# Patient Record
Sex: FEMALE | Race: OTHER | HISPANIC OR LATINO | Employment: STUDENT | URBAN - METROPOLITAN AREA
[De-identification: names, ages, dates, MRNs, and addresses within clinical notes are randomized per-mention and may not be internally consistent; named-entity substitution may affect disease eponyms.]

---
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
## 2023-02-11 ENCOUNTER — OFFICE VISIT (OUTPATIENT)
Dept: URGENT CARE | Facility: CLINIC | Age: 18
End: 2023-02-11

## 2023-02-11 VITALS
OXYGEN SATURATION: 98 % | HEIGHT: 67 IN | SYSTOLIC BLOOD PRESSURE: 111 MMHG | DIASTOLIC BLOOD PRESSURE: 55 MMHG | BODY MASS INDEX: 34.06 KG/M2 | RESPIRATION RATE: 16 BRPM | HEART RATE: 77 BPM | WEIGHT: 217 LBS

## 2023-02-11 DIAGNOSIS — Z02.4 DRIVER'S PERMIT PE (PHYSICAL EXAMINATION): Primary | ICD-10-CM

## 2023-02-11 NOTE — PROGRESS NOTES
[address] Now        NAME: Marco Rosas is a 21 y o  female  : 2005    MRN: [id number]  DATE: 2023  TIME: 2:00 PM    Assessment and Plan   's permit PE (physical examination) [Z02 4]  1  's permit PE (physical examination)              Patient Instructions     --'s permit physical form completed, no restrictions or limitations    Chief Complaint     Chief Complaint   Patient presents with   • drivers permit         History of Present Illness       Here for 's physical    No complaints or issues  No recent illness  Not current under treatment for any medical conditions  Denies injuries, concussions, or physical limitations  Denies history of seizures or other neurological problems  Denies cardiac, pulmonary conditions including sleep apnea  Wears glasses  Rx UTD  No problems with colors or night vision  No hearing loss  Denies mental health issues  Denies frequent/heavy alcohol use  Denies marijuana or recreational drug use  Review of Systems   Review of Systems   Constitutional: Negative for fever  HENT: Negative for hearing loss and sore throat  Eyes: Negative for visual disturbance  Respiratory: Negative for cough, shortness of breath and wheezing  Cardiovascular: Negative for chest pain and palpitations  Gastrointestinal: Negative for abdominal pain, blood in stool, constipation, diarrhea, nausea and vomiting  Genitourinary: Negative for difficulty urinating  Musculoskeletal: Negative for arthralgias and myalgias  Skin: Negative  Neurological: Negative for dizziness and headaches  Psychiatric/Behavioral: Negative  Current Medications     No current outpatient medications on file      Current Allergies     Allergies as of 2023   • (No Known Allergies)            The following portions of the patient's history were reviewed and updated as appropriate: allergies, current medications, past family history, past medical history, past social history, past surgical history and problem list      No past medical history on file  History reviewed  No pertinent surgical history  History reviewed  No pertinent family history  Medications have been verified  Objective   /55   Pulse 77   Resp 16   Ht 5' 7" (1 702 m)   Wt 98 4 kg (217 lb)   SpO2 98%   BMI 33 99 kg/m²   No LMP recorded  Physical Exam     Physical Exam  Constitutional:       General: She is not in acute distress  Appearance: Normal appearance  She is well-developed  She is not ill-appearing, toxic-appearing or diaphoretic  HENT:      Head: Normocephalic  Right Ear: Tympanic membrane, ear canal and external ear normal       Left Ear: Tympanic membrane, ear canal and external ear normal       Nose: No congestion or rhinorrhea  Mouth/Throat:      Pharynx: No oropharyngeal exudate or posterior oropharyngeal erythema  Eyes:      General:         Right eye: No discharge  Left eye: No discharge  Cardiovascular:      Rate and Rhythm: Normal rate and regular rhythm  Heart sounds: Normal heart sounds  No murmur heard  Pulmonary:      Effort: Pulmonary effort is normal  No respiratory distress  Breath sounds: Normal breath sounds  No stridor  No wheezing, rhonchi or rales  Chest:      Chest wall: No tenderness  Abdominal:      Tenderness: There is no abdominal tenderness  Musculoskeletal:         General: No swelling, deformity or signs of injury  Normal range of motion  Cervical back: Neck supple  Lymphadenopathy:      Cervical: No cervical adenopathy  Skin:     General: Skin is warm and dry  Neurological:      Mental Status: She is alert and oriented to person, place, and time  Deep Tendon Reflexes: Reflexes are normal and symmetric  Reflexes normal    Psychiatric:         Mood and Affect: Mood normal          Thought Content:  Thought content normal          Judgment: Judgment normal

## 2023-02-23 ENCOUNTER — OFFICE VISIT (OUTPATIENT)
Dept: OBGYN CLINIC | Facility: CLINIC | Age: 18
End: 2023-02-23

## 2023-02-23 VITALS
BODY MASS INDEX: 33.43 KG/M2 | HEIGHT: 67 IN | WEIGHT: 213 LBS | SYSTOLIC BLOOD PRESSURE: 114 MMHG | RESPIRATION RATE: 18 BRPM | HEART RATE: 88 BPM | DIASTOLIC BLOOD PRESSURE: 73 MMHG

## 2023-02-23 DIAGNOSIS — E28.2 PCOS (POLYCYSTIC OVARIAN SYNDROME): ICD-10-CM

## 2023-02-23 DIAGNOSIS — Z30.09 ENCOUNTER FOR COUNSELING REGARDING CONTRACEPTION: Primary | ICD-10-CM

## 2023-02-23 DIAGNOSIS — Z11.3 SCREENING FOR STD (SEXUALLY TRANSMITTED DISEASE): ICD-10-CM

## 2023-02-23 RX ORDER — NORGESTIMATE AND ETHINYL ESTRADIOL 0.25-0.035
1 KIT ORAL DAILY
Qty: 28 TABLET | Refills: 3 | Status: SHIPPED | OUTPATIENT
Start: 2023-02-23 | End: 2023-05-23

## 2023-02-24 ENCOUNTER — TELEPHONE (OUTPATIENT)
Dept: OBGYN CLINIC | Facility: CLINIC | Age: 18
End: 2023-02-24

## 2023-02-24 DIAGNOSIS — A74.9 CHLAMYDIA: Primary | ICD-10-CM

## 2023-02-24 LAB
C TRACH DNA SPEC QL NAA+PROBE: POSITIVE
N GONORRHOEA DNA SPEC QL NAA+PROBE: NEGATIVE

## 2023-02-24 RX ORDER — DOXYCYCLINE 100 MG/1
100 TABLET ORAL 2 TIMES DAILY
Qty: 14 TABLET | Refills: 0 | Status: SHIPPED | OUTPATIENT
Start: 2023-02-24 | End: 2023-03-03

## 2023-02-24 NOTE — TELEPHONE ENCOUNTER
----- Message from [address] sent at 2/24/2023  8:20 AM EST -----  Please call the patient regarding her abnormal result  Please call pt to inform that she has Chlamydia  Doxycycline 100 mgs BID  x7 days has been ordered  Her partner is to be treated  Recommend to abstain x 7 days post treatment  Recommed condom use whenever she is sexually active  Retest in 3 months     Thanks

## 2023-02-24 NOTE — TELEPHONE ENCOUNTER
Pt informed of positive chlamydia results  Special instructions explained to pt  · Antibiotics  Were ordered to your pharmacy on file  The antibiotic is used to kill the bacteria that causes chlamydia  Take them as directed  · Wash your hands often  Use soap and water  Wash your hands after you use the bathroom  This helps prevent the infection from spreading to other parts of your body, such as your eyes  · Use a latex condom during sex to prevent chlamydia and other STIs  Use a new condom each time you have sex  · Talk to your sex partners  Tell anyone you have had sex with in the last 3 months that you have chlamydia  They may also be infected and need treatment  Ask your sex partners to get tested before you have sex  · Do not have sex until you and your partner have taken all of your antibiotics  · Get regular screenings for STIs  If you are pregnant:  You can spread chlamydia to your baby while you are pregnant  Your baby could get an eye infection or pneumonia  Chlamydia may also cause your baby to be born too early  Early treatment may prevent your baby from getting chlamydia

## 2023-02-24 NOTE — TELEPHONE ENCOUNTER
ID [id number]    Attempted to call, elft a message asking for patient to call the office  Office number provided in message

## 2023-04-24 ENCOUNTER — APPOINTMENT (EMERGENCY)
Dept: RADIOLOGY | Facility: HOSPITAL | Age: 18
End: 2023-04-24

## 2023-04-24 ENCOUNTER — HOSPITAL ENCOUNTER (EMERGENCY)
Facility: HOSPITAL | Age: 18
Discharge: HOME | End: 2023-04-24
Attending: EMERGENCY MEDICINE

## 2023-04-24 VITALS
HEIGHT: 67 IN | DIASTOLIC BLOOD PRESSURE: 73 MMHG | WEIGHT: 210 LBS | TEMPERATURE: 97.8 F | OXYGEN SATURATION: 98 % | HEART RATE: 77 BPM | SYSTOLIC BLOOD PRESSURE: 128 MMHG | BODY MASS INDEX: 32.96 KG/M2 | RESPIRATION RATE: 20 BRPM

## 2023-04-24 DIAGNOSIS — R07.9 CHEST PAIN, UNSPECIFIED TYPE: Primary | ICD-10-CM

## 2023-04-24 PROBLEM — Z11.3 SCREENING FOR STD (SEXUALLY TRANSMITTED DISEASE): Status: RESOLVED | Noted: 2023-02-23 | Resolved: 2023-04-24

## 2023-04-24 LAB
EXT PREGNANCY TEST URINE: NEGATIVE
EXT. CONTROL: NORMAL

## 2023-04-24 NOTE — Clinical Note
Layla Petersen was seen and treated in our emergency department on 4/24/2023  No restrictions            Diagnosis:     Esther Humphreys  may return to work on return date  She may return on this date: 04/25/2023         If you have any questions or concerns, please don't hesitate to call        Sid Tellez MD    ______________________________           _______________          _______________  Hospital Representative                              Date                                Time

## 2023-04-24 NOTE — ED PROVIDER NOTES
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 she developed sudden onset sharp substernal chest pain  No radiation  Not worse with exertion  Has been on and off since then, from as low as 3/10 to as high as 9/10  Maybe some shortness of breath  No other associated symptoms, such as nausea, vomiting, diaphoresis, palpitations, hemoptysis  Has never experienced anything like this before  Has experienced reflux in the past and states this is does not feel reflux  Denies family history of sudden cardiac death or cardiac issues, other than hypertension  Patient does state that she had an upper respiratory illness last week, likely viral, with symptoms of cough, congestion, sore throat, resolved without treatment  Prior to Admission Medications   Prescriptions Last Dose Informant Patient Reported? Taking?   norgestimate-ethinyl estradiol (Sprintec 28) 0 25-35 MG-MCG per tablet   No No   Sig: Take 1 tablet by mouth daily      Facility-Administered Medications: None       History reviewed  No pertinent past medical history  History reviewed  No pertinent surgical history  Family History   Problem Relation Age of Onset   • No Known Problems Mother    • Prostate cancer Father    • No Known Problems Brother    • No Known Problems Brother    • Breast cancer Maternal Aunt    • Colon cancer Neg Hx    • Ovarian cancer Neg Hx    • Deep vein thrombosis Neg Hx    • Pulmonary embolism Neg Hx    • Heart attack Neg Hx    • Stroke Neg Hx      I have reviewed and agree with the history as documented      E-Cigarette/Vaping   • E-Cigarette Use Never User      E-Cigarette/Vaping Substances     Social History     Tobacco Use   • Smoking status: Never • Smokeless tobacco: Never   Vaping Use   • Vaping Use: Never used   Substance Use Topics   • Alcohol use: Never   • Drug use: Never        Review of Systems   Constitutional: Negative  HENT: Negative  Respiratory: Positive for shortness of breath  Negative for cough  Cardiovascular: Positive for chest pain  Negative for palpitations and leg swelling  Gastrointestinal: Negative  Genitourinary: Negative  Musculoskeletal: Negative  Skin: Negative  Neurological: Negative  Psychiatric/Behavioral: Negative  Physical Exam  ED Triage Vitals [04/24/23 1855]   Temperature Pulse Respirations Blood Pressure SpO2   97 8 °F (36 6 °C) 77 20 128/73 98 %      Temp Source Heart Rate Source Patient Position - Orthostatic VS BP Location FiO2 (%)   Oral Monitor Lying Right arm --      Pain Score       --             Orthostatic Vital Signs  Vitals:    04/24/23 1855   BP: 128/73   Pulse: 77   Patient Position - Orthostatic VS: Lying       Physical Exam  Vitals reviewed  Constitutional:       General: She is not in acute distress  Appearance: She is well-developed  She is obese  She is not ill-appearing or diaphoretic  Cardiovascular:      Rate and Rhythm: Normal rate and regular rhythm  Heart sounds: Normal heart sounds  Comments:   -Chest pain reproducible with palpation  Pulmonary:      Effort: Pulmonary effort is normal  No tachypnea  Breath sounds: Normal breath sounds  Abdominal:      General: Bowel sounds are normal       Palpations: Abdomen is soft  Musculoskeletal:         General: Normal range of motion  Right lower leg: No edema  Left lower leg: No edema  Skin:     General: Skin is warm and dry  Neurological:      General: No focal deficit present  Mental Status: She is alert and oriented to person, place, and time     Psychiatric:         Mood and Affect: Mood normal          Behavior: Behavior normal          ED Medications  Medications - No data to display    Diagnostic Studies  Results Reviewed     None                 No orders to display         Procedures  ECG 12 Lead Documentation Only    Date/Time: 4/24/2023 7:53 PM  Performed by: Maxi Menjivar MD  Authorized by: Maxi Menjivar MD     Indications / Diagnosis:  Chest pain  ECG reviewed by me, the ED Provider: yes    Patient location:  ED  Previous ECG:     Previous ECG:  Unavailable  Interpretation:     Interpretation: normal    Rate:     ECG rate assessment: normal    Rhythm:     Rhythm: sinus rhythm    Ectopy:     Ectopy: none    QRS:     QRS axis:  Normal  Conduction:     Conduction: normal    ST segments:     ST segments:  Normal  T waves:     T waves: normal            ED Course                                       Medical Decision Making  70-year-old female with no significant past medical history presenting today for chest pain  Sharp, substernal pain onset at rest, not worse with exertion  No other associated symptoms  Reproducible with palpation  EKG normal sinus rhythm, as independently interpreted by myself  Chest pain with no acute cardiopulmonary abnormalities, as independently interpreted by myself  Doubtful ACS  Consider pericarditis given recent viral illness  However, no evidence on EKG  Consider PE, given PERC positive (hormone use), but Wells score 0  Differential includes GERD, idiopathic pleurisy, costochondritis  However, etiology still undetermined  Pain improving without treatment at time of discharge  Patient discharged home with return precautions and instructed to follow-up with her PCP  Chest pain, unspecified type: acute illness or injury  Amount and/or Complexity of Data Reviewed  Labs: ordered  Radiology: ordered and independent interpretation performed  Decision-making details documented in ED Course  ECG/medicine tests: ordered and independent interpretation performed   Decision-making details documented in ED Course  Disposition  Final diagnoses:   None     ED Disposition     None      Follow-up Information    None         Patient's Medications   Discharge Prescriptions    No medications on file     No discharge procedures on file  PDMP Review     None           ED Provider  Attending physically available and evaluated Shonda Rich I managed the patient along with the ED Attending      Electronically Signed by         Zenaida Flores MD  04/24/23 2026

## 2023-04-25 LAB
ATRIAL RATE: 72 BPM
P AXIS: 50 DEGREES
PR INTERVAL: 132 MS
QRS AXIS: 17 DEGREES
QRSD INTERVAL: 78 MS
QT INTERVAL: 382 MS
QTC INTERVAL: 418 MS
T WAVE AXIS: 24 DEGREES
VENTRICULAR RATE: 72 BPM

## 2023-06-06 PROBLEM — A74.9 CHLAMYDIA: Status: ACTIVE | Noted: 2023-06-06

## 2023-06-06 PROBLEM — Z20.2 POSSIBLE EXPOSURE TO STD: Status: ACTIVE | Noted: 2023-06-06

## 2023-06-24 ENCOUNTER — HOSPITAL ENCOUNTER (EMERGENCY)
Facility: HOSPITAL | Age: 18
Discharge: HOME | End: 2023-06-24
Attending: EMERGENCY MEDICINE
Payer: COMMERCIAL

## 2023-06-24 ENCOUNTER — APPOINTMENT (EMERGENCY)
Dept: RADIOLOGY | Facility: HOSPITAL | Age: 18
End: 2023-06-24
Payer: COMMERCIAL

## 2023-06-24 VITALS
HEART RATE: 86 BPM | BODY MASS INDEX: 31.83 KG/M2 | HEIGHT: 68 IN | SYSTOLIC BLOOD PRESSURE: 128 MMHG | TEMPERATURE: 99 F | DIASTOLIC BLOOD PRESSURE: 62 MMHG | WEIGHT: 210 LBS | RESPIRATION RATE: 18 BRPM | OXYGEN SATURATION: 98 %

## 2023-06-24 DIAGNOSIS — M25.539 WRIST PAIN: ICD-10-CM
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
## 2023-06-24 PROCEDURE — 29130 APPL FINGER SPLINT STATIC: CPT | Performed by: EMERGENCY MEDICINE

## 2023-06-24 PROCEDURE — 73110 X-RAY EXAM OF WRIST: CPT

## 2023-06-24 PROCEDURE — 99284 EMERGENCY DEPT VISIT MOD MDM: CPT | Performed by: EMERGENCY MEDICINE

## 2023-06-24 PROCEDURE — 99284 EMERGENCY DEPT VISIT MOD MDM: CPT

## 2023-06-24 RX ORDER — IBUPROFEN 600 MG/1
600 TABLET ORAL ONCE
Status: COMPLETED | OUTPATIENT
Start: 2023-06-24 | End: 2023-06-24

## 2023-06-24 RX ORDER — ACETAMINOPHEN 325 MG/1
650 TABLET ORAL ONCE
Status: COMPLETED | OUTPATIENT
Start: 2023-06-24 | End: 2023-06-24

## 2023-06-24 RX ADMIN — ACETAMINOPHEN 650 MG: 325 TABLET ORAL at 16:36

## 2023-06-24 RX ADMIN — IBUPROFEN 600 MG: 600 TABLET, FILM COATED ORAL at 18:23
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
## 2023-06-24 NOTE — Clinical Note
Bartolo Lira was seen and treated in our emergency department on 6/24/2023  No work until cleared by Family Doctor/Orthopedics        Diagnosis:     Elder    She may return on this date: If you have any questions or concerns, please don't hesitate to call        Ryan Stern, DO    ______________________________           _______________          _______________  Hospital Representative                              Date                                Time
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
## 2023-06-25 ENCOUNTER — HOSPITAL ENCOUNTER (EMERGENCY)
Facility: HOSPITAL | Age: 18
Discharge: HOME | End: 2023-06-25
Attending: EMERGENCY MEDICINE
Payer: COMMERCIAL

## 2023-06-25 VITALS
RESPIRATION RATE: 16 BRPM | DIASTOLIC BLOOD PRESSURE: 72 MMHG | TEMPERATURE: 98.1 F | WEIGHT: 210 LBS | OXYGEN SATURATION: 100 % | HEIGHT: 68 IN | BODY MASS INDEX: 31.83 KG/M2 | HEART RATE: 70 BPM | SYSTOLIC BLOOD PRESSURE: 119 MMHG

## 2023-06-25 DIAGNOSIS — M79.609 PAIN IN LIMB: Primary | ICD-10-CM

## 2023-06-25 PROCEDURE — 96372 THER/PROPH/DIAG INJ SC/IM: CPT

## 2023-06-25 PROCEDURE — 99283 EMERGENCY DEPT VISIT LOW MDM: CPT

## 2023-06-25 RX ORDER — KETOROLAC TROMETHAMINE 30 MG/ML
30 INJECTION, SOLUTION INTRAMUSCULAR; INTRAVENOUS ONCE
Status: COMPLETED | OUTPATIENT
Start: 2023-06-25 | End: 2023-06-25

## 2023-06-25 RX ADMIN — KETOROLAC TROMETHAMINE 30 MG: 30 INJECTION, SOLUTION INTRAMUSCULAR at 17:10
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
## 2023-06-25 NOTE — DISCHARGE INSTRUCTIONS
Return to er with new sx, worsening sx or with any other concerns  Follow up with ortho as instructed yesterday

## 2023-06-26 ENCOUNTER — TELEPHONE (OUTPATIENT)
Dept: OBGYN CLINIC | Facility: OTHER | Age: 18
End: 2023-06-26

## 2023-06-26 NOTE — TELEPHONE ENCOUNTER
Patient is being referred to a orthopedics  Please schedule accordingly      [address]   [phone]

## 2023-07-08 ENCOUNTER — OFFICE VISIT (OUTPATIENT)
Dept: OBGYN CLINIC | Facility: CLINIC | Age: 18
End: 2023-07-08

## 2023-07-08 VITALS
WEIGHT: 225 LBS | BODY MASS INDEX: 34.1 KG/M2 | SYSTOLIC BLOOD PRESSURE: 117 MMHG | HEIGHT: 68 IN | OXYGEN SATURATION: 98 % | HEART RATE: 83 BPM | DIASTOLIC BLOOD PRESSURE: 85 MMHG

## 2023-07-08 DIAGNOSIS — S60.211A CONTUSION OF RIGHT WRIST, INITIAL ENCOUNTER: Primary | ICD-10-CM
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
## 2023-07-08 DIAGNOSIS — M25.539 WRIST PAIN: ICD-10-CM

## 2023-07-08 PROCEDURE — 99203 OFFICE O/P NEW LOW 30 MIN: CPT | Performed by: PHYSICIAN ASSISTANT
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
## 2023-07-14 ENCOUNTER — HOSPITAL ENCOUNTER (EMERGENCY)
Facility: HOSPITAL | Age: 18
Discharge: HOME | End: 2023-07-14
Attending: EMERGENCY MEDICINE
Payer: COMMERCIAL

## 2023-07-14 VITALS
SYSTOLIC BLOOD PRESSURE: 103 MMHG | OXYGEN SATURATION: 100 % | HEART RATE: 85 BPM | RESPIRATION RATE: 17 BRPM | TEMPERATURE: 98.4 F | DIASTOLIC BLOOD PRESSURE: 60 MMHG

## 2023-07-14 DIAGNOSIS — R21 RASH: Primary | ICD-10-CM

## 2023-07-14 PROCEDURE — 99283 EMERGENCY DEPT VISIT LOW MDM: CPT

## 2023-07-14 PROCEDURE — 99283 EMERGENCY DEPT VISIT LOW MDM: CPT | Performed by: PHYSICIAN ASSISTANT

## 2023-07-14 RX ORDER — DIAPER,BRIEF,INFANT-TODD,DISP
EACH MISCELLANEOUS
Qty: 45 G | Refills: 0 | Status: SHIPPED | OUTPATIENT
Start: 2023-07-14

## 2023-07-14 NOTE — ED PROVIDER NOTES
History  Chief Complaint   Patient presents with   • Breast Problem     Pt presents with a two day hx of itchy painful rash to right breast, denies any new products, no breast feeding     Past Medical History: Back pain, PCOS   No PSH  Pt presents to ED c/o 5 days of symptoms that began with pain to lower/outer right breast then the next day noticed a red, pruritic rash to medial, upper aspect of right breast while in the shower, that has persisted and gotten worse over the past few days. NO fever, no cp, sob, no dc, no breast mass/lumps felt. PT denies known allergens, new contacts, sprays, lotions, detergents, products. Pt states she was concerned, " bc her father has cancer and she wanted to be checked out"            Prior to Admission Medications   Prescriptions Last Dose Informant Patient Reported? Taking?   norgestimate-ethinyl estradiol (Sprintec 28) 0.25-35 MG-MCG per tablet   No No   Sig: Take 1 tablet by mouth daily      Facility-Administered Medications: None       Past Medical History:   Diagnosis Date   • Back pain    • PCOS (polycystic ovarian syndrome)        History reviewed. No pertinent surgical history. Family History   Problem Relation Age of Onset   • No Known Problems Mother    • Prostate cancer Father    • No Known Problems Brother    • No Known Problems Brother    • Breast cancer Maternal Aunt    • Colon cancer Neg Hx    • Ovarian cancer Neg Hx    • Deep vein thrombosis Neg Hx    • Pulmonary embolism Neg Hx    • Heart attack Neg Hx    • Stroke Neg Hx      I have reviewed and agree with the history as documented. E-Cigarette/Vaping   • E-Cigarette Use Never User      E-Cigarette/Vaping Substances     Social History     Tobacco Use   • Smoking status: Never   • Smokeless tobacco: Never   Vaping Use   • Vaping Use: Never used   Substance Use Topics   • Alcohol use: Never   • Drug use: Never       Review of Systems   Constitutional: Negative for fever.    HENT: Negative for rhinorrhea and trouble swallowing. Respiratory: Negative for shortness of breath. Cardiovascular: Negative for chest pain. Gastrointestinal: Negative for diarrhea and vomiting. Genitourinary: Negative for vaginal bleeding and vaginal discharge. Musculoskeletal: Negative for myalgias. Skin: Positive for rash. Neurological: Negative for headaches. All other systems reviewed and are negative. Physical Exam  Physical Exam  Vitals and nursing note reviewed. Constitutional:       General: She is not in acute distress. Appearance: She is well-developed. She is obese. HENT:      Head: Normocephalic and atraumatic. Right Ear: External ear normal.      Left Ear: External ear normal.      Nose: Nose normal.      Mouth/Throat:      Mouth: Mucous membranes are moist.      Pharynx: Oropharynx is clear. Eyes:      Conjunctiva/sclera: Conjunctivae normal.   Cardiovascular:      Rate and Rhythm: Tachycardia present. Pulmonary:      Effort: Pulmonary effort is normal. No respiratory distress. Chest:   Breasts:     Right: Skin change and tenderness present. No swelling, bleeding, inverted nipple or nipple discharge. Comments: Area of rash (circled in red) appears as slightly raised, red rash with linear excoriation, no erythema, no warmth, no dc, no palpable mass, no dimpling, no nipple dc, no abscess  Musculoskeletal:         General: Normal range of motion. Cervical back: Normal range of motion. Skin:     General: Skin is warm and dry. Findings: Rash present. Neurological:      Mental Status: She is alert and oriented to person, place, and time.    Psychiatric:         Behavior: Behavior normal.         Vital Signs  ED Triage Vitals   Temperature Pulse Respirations Blood Pressure SpO2   07/14/23 1258 07/14/23 1256 07/14/23 1256 07/14/23 1256 07/14/23 1256   98.4 °F (36.9 °C) 85 17 103/60 100 %      Temp Source Heart Rate Source Patient Position - Orthostatic VS BP Location FiO2 (%)   07/14/23 1258 07/14/23 1256 07/14/23 1256 07/14/23 1256 --   Oral Monitor Sitting Right arm       Pain Score       --                  Vitals:    07/14/23 1256   BP: 103/60   Pulse: 85   Patient Position - Orthostatic VS: Sitting         Visual Acuity      ED Medications  Medications - No data to display    Diagnostic Studies  Results Reviewed     None                 No orders to display              Procedures  Procedures         ED Course         CRAFFT    Flowsheet Row Most Recent Value   CRAFFT Initial Screen: During the past 12 months, did you:    1. Drink any alcohol (more than a few sips)? No Filed at: 07/14/2023 1258   2. Smoke any marijuana or hashish No Filed at: 07/14/2023 1258   3. Use anything else to get high? ("anything else" includes illegal drugs, over the counter and prescription drugs, and things that you sniff or 'ramos')? No Filed at: 07/14/2023 1258                                          Medical Decision Making  Patient here with pain and pruritic rash to right breast  DDx Contact dermatitis, insect bite/sting, local allergy, cellulitis, among others  Rash appears more like local allergic reaction we will treat with topical low-dose steroid and p.o. Benadryl. Risk  OTC drugs. Prescription drug management. Disposition  Final diagnoses:   Rash - right breast     Time reflects when diagnosis was documented in both MDM as applicable and the Disposition within this note     Time User Action Codes Description Comment    7/14/2023  1:14 PM Davidalton aFy Add [R21] Rash     7/14/2023  1:14 PM David Laundry Modify [R21] Rash right breast      ED Disposition     ED Disposition   Discharge    Condition   Stable    Date/Time   Fri Jul 14, 2023  1:13 PM    Comment   Jonathan Jeong discharge to home/self care.                Follow-up Information     Follow up With Specialties Details Why Contact [facility]   [address] Patient's Medications   Discharge Prescriptions    HYDROCORTISONE 1 % CREAM    Apply to affected area 2 times daily       Start Date: 7/14/2023 End Date: --       Order Dose: --       Quantity: 45 g    Refills: 0       No discharge procedures on file.     PDMP Review     None          ED Provider  Electronically Signed by           Dodie Musa PA-C  07/14/23 3693

## 2023-07-14 NOTE — DISCHARGE INSTRUCTIONS
Use Tylenol every 4 hours or Ibuprofen every 6 hours; you can alternate the 2 medications taking something every 3 hours for pain. Use Steroid cream as directed. You can use over-the-counter antihistamines, Benadryl 25mg every 6 hours for itching and 50mg at night. Ice to area will also help with pain/sweling/itching/rash. Try to avoid anything getting contact with skin    Follow-up with your doctor in next few days.

## 2023-07-18 ENCOUNTER — APPOINTMENT (OUTPATIENT)
Dept: RADIOLOGY | Facility: AMBULARY SURGERY CENTER | Age: 18
End: 2023-07-18
Attending: STUDENT IN AN ORGANIZED HEALTH CARE EDUCATION/TRAINING PROGRAM

## 2023-07-18 ENCOUNTER — OFFICE VISIT (OUTPATIENT)
Dept: OBGYN CLINIC | Facility: CLINIC | Age: 18
End: 2023-07-18
Payer: COMMERCIAL

## 2023-07-18 VITALS
DIASTOLIC BLOOD PRESSURE: 72 MMHG | HEART RATE: 75 BPM | SYSTOLIC BLOOD PRESSURE: 114 MMHG | HEIGHT: 68 IN | WEIGHT: 225 LBS | BODY MASS INDEX: 34.1 KG/M2

## 2023-07-18 DIAGNOSIS — M25.531 PAIN IN RIGHT WRIST: ICD-10-CM

## 2023-07-18 DIAGNOSIS — S60.211A CONTUSION OF RIGHT WRIST, INITIAL ENCOUNTER: Primary | ICD-10-CM

## 2023-07-18 PROCEDURE — 99213 OFFICE O/P EST LOW 20 MIN: CPT | Performed by: STUDENT IN AN ORGANIZED HEALTH CARE EDUCATION/TRAINING PROGRAM

## 2023-07-18 PROCEDURE — 73110 X-RAY EXAM OF WRIST: CPT
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
## 2023-08-08 ENCOUNTER — EVALUATION (OUTPATIENT)
Dept: OCCUPATIONAL THERAPY | Facility: CLINIC | Age: 18
End: 2023-08-08
Payer: COMMERCIAL

## 2023-08-08 DIAGNOSIS — S60.211A CONTUSION OF RIGHT WRIST, INITIAL ENCOUNTER: ICD-10-CM

## 2023-08-08 DIAGNOSIS — M25.531 PAIN IN RIGHT WRIST: Primary | ICD-10-CM

## 2023-08-08 PROCEDURE — 97165 OT EVAL LOW COMPLEX 30 MIN: CPT
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
## 2023-08-11 ENCOUNTER — OFFICE VISIT (OUTPATIENT)
Dept: OCCUPATIONAL THERAPY | Facility: CLINIC | Age: 18
End: 2023-08-11
Payer: COMMERCIAL

## 2023-08-11 DIAGNOSIS — M25.531 PAIN IN RIGHT WRIST: Primary | ICD-10-CM

## 2023-08-11 PROCEDURE — 97140 MANUAL THERAPY 1/> REGIONS: CPT

## 2023-08-11 PROCEDURE — 97110 THERAPEUTIC EXERCISES: CPT

## 2023-08-11 NOTE — PROGRESS NOTES
Daily Note     Today's date: 2023  Patient name: Irma Eaton  : 2005  MRN: [id number]  Referring provider: Chhaya Bran MD  Dx:   Encounter Diagnosis     ICD-10-CM    1. Pain in right wrist  M25.531                      Subjective: It was a little sore yesterday but it is definitely getting better      Objective: See treatment diary below  Wrist Flexion: 50  Extension: 70    Assessment: Tolerated treatment well. Patient displayed significantly increased wrist and digit ROM on this date after AROM and therapeutic activities. Patient is progressing very well. Fatigue noted during sustained pencil grasps    Plan: Continue per plan of care. Progress treatment as tolerated.        Precautions: Universal      Manuals            STM  4'           MEM  4'                                     Neuro Re-Ed                                                                                                        Ther Ex             HEP Tendon glides    Opposition    Wrist F/E            Digit AROM             Wrist AROM  x20 F/E and R/U deviation w/ dowel           Hook fist  x5 down and back           Metal Sphere  2'           TB rotations             Wrist Maze  x5           Pencil Grasps  x1 (Down to 6)           Ther Activity             Keypegs  x1                                                               Modalities             MHP  5'

## 2023-08-16 ENCOUNTER — OFFICE VISIT (OUTPATIENT)
Dept: OCCUPATIONAL THERAPY | Facility: CLINIC | Age: 18
End: 2023-08-16
Payer: COMMERCIAL

## 2023-08-16 DIAGNOSIS — M25.531 PAIN IN RIGHT WRIST: Primary | ICD-10-CM

## 2023-08-16 PROCEDURE — 97110 THERAPEUTIC EXERCISES: CPT

## 2023-08-16 NOTE — PROGRESS NOTES
Daily Note     Today's date: 2023  Patient name: Ema Jaramillo  : 2005  MRN: [id number]  Referring provider: Nan Crenshaw MD  Dx:   Encounter Diagnosis     ICD-10-CM    1. Pain in right wrist  M25.531                      Subjective: It was sore when I woke up but it's feeling better now. I'm very happy with how therapy is going      Objective: See treatment diary below  Wrist Flexion: 55  Extension: 70    Assessment: Tolerated treatment well. Patient displayed ability to touch the pads of their digits to their palm today but continues to be unable to form a full composite fist. Wrist ROM is also improving but still limited compared to the contralateral side. Patient continues to have pain and discomfort on the ulnar wrist, especially in the morning. Patient is improving well and would benefit from continued OP OT services to decrease pain and increase functional use of the affected UE. Plan: Continue per plan of care. Progress treatment as tolerated.        Precautions: Universal      Manuals           STM  4' 4'          MEM  4' 4'                                    Neuro Re-Ed                                                                                                        Ther Ex             HEP Tendon glides    Opposition    Wrist F/E            Digit AROM             Wrist AROM  x20 F/E and R/U deviation w/ dowel x20 F/E and R/U deviation w/ dowel          Hook fist  x5 down and back x5 down and back          Metal Sphere  2' 2'          TB rotations             Wrist Maze  x5 x5          Pencil Grasps  x1 (Down to 6)           Ther Activity             Keypegs  x1 x1          Colored Pegs   x3 rows                                                 Modalities             MHP  5' 5'

## 2023-08-18 ENCOUNTER — OFFICE VISIT (OUTPATIENT)
Dept: OCCUPATIONAL THERAPY | Facility: CLINIC | Age: 18
End: 2023-08-18
Payer: COMMERCIAL

## 2023-08-18 DIAGNOSIS — S60.211A CONTUSION OF RIGHT WRIST, INITIAL ENCOUNTER: ICD-10-CM

## 2023-08-18 DIAGNOSIS — M25.531 PAIN IN RIGHT WRIST: Primary | ICD-10-CM

## 2023-08-18 PROCEDURE — 97110 THERAPEUTIC EXERCISES: CPT

## 2023-08-18 PROCEDURE — 97140 MANUAL THERAPY 1/> REGIONS: CPT

## 2023-08-18 PROCEDURE — 97530 THERAPEUTIC ACTIVITIES: CPT

## 2023-08-18 NOTE — PROGRESS NOTES
Daily Note     Today's date: 2023  Patient name: Luana Chino  : 2005  MRN: [id number]  Referring provider: Nelly Ignacio MD  Dx:   Encounter Diagnosis     ICD-10-CM    1. Pain in right wrist  M25.531       2. Contusion of right wrist, initial encounter  J33.893R                      Subjective: It hurts mostly here (referring to ulnar wrist). Objective: See treatment diary below      Assessment: Tolerated treatment well. Tenderness and tightness with IASTM and stretching, but was lessened post-tx. Applied KT to ular f/a, and wrist for support. Plan: Continue per plan of care. Progress treatment as tolerated.        Precautions: Universal      Manuals          STM  4' 4'          MEM  4' 4'          IASTM uln wrist    8'                      Neuro Re-Ed                                                                                                        Ther Ex             HEP Tendon glides    Opposition    Wrist F/E            Wrist stretching    5'         Wrist AROM  x20 F/E and R/U deviation w/ dowel x20 F/E and R/U deviation w/ dowel          Hook fist  x5 down and back x5 down and back          Metal Sphere  2' 2' 2'         Wall walking    TB 5x each         Wrist Maze  x5 x5 5x         Pencil Grasps  x1 (Down to 6)           Ther Activity             Keypegs  x1 x1 1x         Colored Pegs   x3 rows                                                 Modalities             MHP  5' 5' 5'

## 2023-08-24 ENCOUNTER — OFFICE VISIT (OUTPATIENT)
Dept: OCCUPATIONAL THERAPY | Facility: CLINIC | Age: 18
End: 2023-08-24
Payer: COMMERCIAL

## 2023-08-24 DIAGNOSIS — S60.211A CONTUSION OF RIGHT WRIST, INITIAL ENCOUNTER: ICD-10-CM

## 2023-08-24 DIAGNOSIS — M25.531 PAIN IN RIGHT WRIST: Primary | ICD-10-CM

## 2023-08-24 PROCEDURE — 97140 MANUAL THERAPY 1/> REGIONS: CPT

## 2023-08-24 PROCEDURE — 97110 THERAPEUTIC EXERCISES: CPT

## 2023-08-24 PROCEDURE — 97530 THERAPEUTIC ACTIVITIES: CPT

## 2023-08-24 NOTE — PROGRESS NOTES
Psychosis  Psychosis is a symptom of certain mental health problems. It involves perceiving reality differently from those around you. The difference between reality and what you think become blurred in your mind. Other mental health conditions, physical diseases, traumatic experiences, or drugs and toxins may bring on psychotic symptoms or behavior.  There are different kinds of psychosis:  · Drug-induced (due to alcohol, methamphetamine, cocaine, LSD, PCP and others)  · Bipolar disorder  · Depression  · Schizophrenia  · Dementia  Symptoms  The symptoms of psychosis may not all be the same for each person. However, they usually involve:  · Hallucinations. Seeing, hearing, feeling, or even tasting or smelling things that are not there  · Delusions. Believing something that is not true, or false beliefs that are not part of a person's Methodist or cultural background.  There may also be disturbances in thinking, speech and behavior, which can include:  · Hearing voices that others do not hear  · Seeing things that others do not see  · Racing thoughts  · Lack of energy  · Feeling very fearful  · Disorganized speech  · Intentional or unintentional bodily harm to others  · Paranoia  · Trouble thinking or concentrating clearly  · Depression, feeling suicidal  · Insomnia  · Withdrawal from those around you  Treatment for psychosis depends on the cause. Medicine, with or without psychotherapy, is often used.  Home care  · Find a healthcare provider and therapist who meet your needs.  Seek help when you feel like your symptoms are returning  · Be certain to tell each of your healthcare providers about all of the prescription drugs, over-the-counter medicines, and supplements you take. Certain supplements interact with medicines and can result in dangerous side effects. Ask your pharmacist when you have questions about drug interactions.  · Be sure to take your medicine as directed even if you think you don't need  Daily Note     Today's date: 2023  Patient name: Veronica Sanz  : 2005  MRN: [id number]  Referring provider: Anya Bob MD  Dx:   Encounter Diagnosis     ICD-10-CM    1. Pain in right wrist  M25.531       2. Contusion of right wrist, initial encounter  T44.252X                      Subjective: It hurts in the middle of the wrist (dorsal side). Objective: See treatment diary below    Assessment: Tolerated treatment fair. Pain and swelling present dorsal central wrist.  Focused STM and applied KT to this area. Difficulty with light  and pinch due to weakness. Plan: Continue per plan of care. Progress treatment as tolerated.        Precautions: Universal      Manuals         STM  4' 4'          MEM  4' 4'          IASTM uln wrist    8' 8'        KT    uln f/a, wrist dorsal wrist        Neuro Re-Ed                                                                                                        Ther Ex             HEP Tendon glides    Opposition    Wrist F/E            Wrist stretching    5' 5'         Wrist AROM  x20 F/E and R/U deviation w/ dowel x20 F/E and R/U deviation w/ dowel          Hook fist  x5 down and back x5 down and back          Metal Sphere  2' 2' 2' 2'        Wall walking    TB 5x each TB 5x each        Wrist Maze  x5 x5 5x 5x        Gripping-digiflex     yellow isol 10x  each        pinching     velcro board 1x                     Pencil Grasps  x1 (Down to 6)           Ther Activity             Keypegs  x1 x1 1x 1x        Colored Pegs   x3 rows                                                 Modalities             MHP  5' 5' 5' 5' it.  · Follow-up with lab tests as advised by your healthcare provider.  · Talk with your family about your feelings and thoughts. Ask them to help you recognize any behavior changes so you can get help and, if needed, medicines can be adjusted.  Follow-up care  Follow up with your counselor, therapist or psychiatrist as advised.  Call 911  Call 911 if you:  · Have suicidal thoughts, a suicide plan, and the means to carry out the plan  · Have troubled breathing  · Are very confused  · Are very drowsy or have trouble awakening  · Faint or lose consciousness  · Have a rapid heart rate, very low heart rate, or a new irregular heart rate  · Have a seizure  When to seek medical advice  Call your healthcare provider right away if any of these occur:  · Gradual or rapid return of psychotic symptoms  · Feeling like you want to harm yourself or another  · Feeling extremely depressed  · Feeling very anxious, agitated, or angry  · Feeling out of control or being controlled by others  · Unable to care for yourself  · Seeing things or hearing voices that you know aren't real  © 20002598-2320 [employer]. [address] [place] [address]. All rights reserved. This information is not intended as a substitute for professional medical care. Always follow your healthcare professional's instructions.          Depression  Depression is one of the most common mental health problems today. It is not just a state of unhappiness or sadness. It is a true disease. The cause seems to be related to a decrease in chemicals that transmit signals in the brain. Having a family history of depression, alcoholism, or suicide increases the risk. Chronic illness, chronic pain, migraine headaches and high emotional stress also increase the risk.  Depression is something we tend to recognize in others, but may have a hard time seeing in ourselves. It can show in many physical and emotional ways:  · Loss of  appetite  · Over-eating  · Not being able to sleep  · Sleeping too much  · Tiredness not related to physical exertion  · Restlessness or irritability  · Slowness of movement or speech  · Feeling depressed or withdrawn  · Loss of interest in things you once enjoyed  · Trouble concentrating, poor memory, trouble making decisions  · Thoughts of harming or killing oneself, or thoughts that life is not worth living  · Low self-esteem  The treatment for depression may include both medicine and psychotherapy. Antidepressants can reduce suffering and can improve the ability to function during the depressed period. Therapy can offer emotional support and help you understand emotional factors that may be causing the depression.  Home care  · On-going care and support helps people manage this disease.  Find a healthcare provider and therapist who meet your needs. Seek help when you feel like you may be getting ill.  · Be kind to yourself. Make it a point to do things that you enjoy (gardening, walking in nature, going to a movie, etc.). Reward yourself for small successes.  · Take care of your physical body. Eat a balanced diet (low in saturated fat and high in fruits and vegetables). Exercise at least 3 times a week for 30 minutes. Even mild-moderate exercise (like brisk walking) can make you feel better.  · Avoid alcohol, which can make depression worse.  · Take medicine as prescribed.  · Tell each of your healthcare providers about all of the prescription drugs, over-the-counter medicines, vitamins, and supplements you take. Certain supplements interact with medicines and can result in dangerous side effects. Ask your pharmacist when you have questions about drug interactions.  · Talk with your family and trusted friends about your feelings and thoughts. Ask them to help you recognize behavior changes early so you can get help and, if needed, medicine can be adjusted.  Follow-up care  Follow up with your healthcare  provider, or as advised.  Call 911  Call 911 if you:  · Have suicidal thoughts, a suicide plan, and the means to carry out the plan  · Have trouble breathing  · Are very confused  · Feel very drowsy or have trouble awakening  · Faint or lose consciousness  · Have new chest pain that becomes more severe, lasts longer, or spreads into your shoulder, arm, neck, jaw or back  When to seek medical advice  Call your healthcare provider right away if any of these occur:  · Feeling extreme depression, fear, anxiety, or anger toward yourself or others  · Feeling out of control  · Feeling that you may try to harm yourself or another  · Hearing voices that others do not hear  · Seeing things that others do not see  · Can’t sleep or eat for 3 days in a row  · Friends or family express concern over your behavior and ask you to seek help  © 0758-0766 [employer]. [address] [place] [address], [place] [address]. All rights reserved. This information is not intended as a substitute for professional medical care. Always follow your healthcare professional's instructions.

## 2023-08-28 ENCOUNTER — APPOINTMENT (OUTPATIENT)
Dept: OCCUPATIONAL THERAPY | Facility: CLINIC | Age: 18
End: 2023-08-28
Payer: COMMERCIAL

## 2023-08-29 ENCOUNTER — OFFICE VISIT (OUTPATIENT)
Dept: OBGYN CLINIC | Facility: CLINIC | Age: 18
End: 2023-08-29
Payer: COMMERCIAL

## 2023-08-29 ENCOUNTER — OFFICE VISIT (OUTPATIENT)
Dept: OCCUPATIONAL THERAPY | Facility: CLINIC | Age: 18
End: 2023-08-29
Payer: COMMERCIAL

## 2023-08-29 VITALS — WEIGHT: 225 LBS | HEIGHT: 68 IN | BODY MASS INDEX: 34.1 KG/M2

## 2023-08-29 DIAGNOSIS — M25.531 PAIN IN RIGHT WRIST: Primary | ICD-10-CM

## 2023-08-29 DIAGNOSIS — S60.211D CONTUSION OF RIGHT WRIST, SUBSEQUENT ENCOUNTER: Primary | ICD-10-CM

## 2023-08-29 PROCEDURE — 97110 THERAPEUTIC EXERCISES: CPT

## 2023-08-29 PROCEDURE — 99214 OFFICE O/P EST MOD 30 MIN: CPT | Performed by: STUDENT IN AN ORGANIZED HEALTH CARE EDUCATION/TRAINING PROGRAM

## 2023-08-29 PROCEDURE — 97140 MANUAL THERAPY 1/> REGIONS: CPT

## 2023-08-29 NOTE — PROGRESS NOTES
ORTHOPAEDIC HAND, WRIST, AND ELBOW OFFICE  VISIT       ASSESSMENT/PLAN:      Diagnoses and all orders for this visit:    Contusion of right wrist, subsequent encounter  -     MRI wrist right wo contrast; Future  -     Diclofenac Sodium (VOLTAREN) 1 %; Apply 2 g topically 4 (four) times a day      19 y.o. female with right wrist contusion  Treatment options and expected outcomes were discussed. The patient verbalized understanding of exam findings and treatment plan. The patient was given the opportunity to ask questions. Questions were answered to the patient's satisfaction. Continue Hand Therapy  Voltaren gel sent to pharmacy  Patient with some improvement in symptoms from prior examination, however, still has significant symptoms. We would like to evaluate more closely with MRI to assess for any ligamentous injury, such as to SL ligament. She has tenderness over SL ligament and Cruzito's tubercle. MRI ordered      Follow Up:  6 weeks       To Do Next Visit:  Re-evaluation of current issue    Ruben Barboza MD  Attending, Orthopaedic Surgery  Hand, Wrist, and Elbow Surgery  [facility]    ______________________________________________________________________________________________________________________________    CHIEF COMPLAINT:  Chief Complaint   Patient presents with   • Right Wrist - Follow-up     SUBJECTIVE:  Patient is a 19 y.o. RHD female who presents today for follow up of right wrist contusion. She is still having pain, although it has decreased. She notes twisting motions, weight bearing, and gripping exacerbates pain. I have personally reviewed all the relevant PMH, PSH, SH, FH, Medications and allergies      PAST MEDICAL HISTORY:  Past Medical History:   Diagnosis Date   • Back pain    • PCOS (polycystic ovarian syndrome)      PAST SURGICAL HISTORY:  History reviewed. No pertinent surgical history.     FAMILY HISTORY:  Family History   Problem Relation Age of Onset • No Known Problems Mother    • Prostate cancer Father    • No Known Problems Brother    • No Known Problems Brother    • Breast cancer Maternal Aunt    • Colon cancer Neg Hx    • Ovarian cancer Neg Hx    • Deep vein thrombosis Neg Hx    • Pulmonary embolism Neg Hx    • Heart attack Neg Hx    • Stroke Neg Hx      SOCIAL HISTORY:  Social History     Tobacco Use   • Smoking status: Never   • Smokeless tobacco: Never   Vaping Use   • Vaping Use: Never used   Substance Use Topics   • Alcohol use: Never   • Drug use: Never     MEDICATIONS:    Current Outpatient Medications:   •  Diclofenac Sodium (VOLTAREN) 1 %, Apply 2 g topically 4 (four) times a day, Disp: 100 g, Rfl: 1  •  hydrocortisone 1 % cream, Apply to affected area 2 times daily, Disp: 45 g, Rfl: 0  •  norgestimate-ethinyl estradiol (Sprintec 28) 0.25-35 MG-MCG per tablet, Take 1 tablet by mouth daily, Disp: 28 tablet, Rfl: 3    ALLERGIES:  No Known Allergies    REVIEW OF SYSTEMS:  Musculoskeletal:        As noted in HPI. All other systems reviewed and are negative. VITALS:  There were no vitals filed for this visit. LABS:  HgA1c: No results found for: "HGBA1C"  BMP: No results found for: "GLUCOSE", "CALCIUM", "NA", "K", "CO2", "CL", "BUN", "CREATININE"  _____________________________________________________  PHYSICAL EXAMINATION:  General: Well developed and well nourished, alert & oriented x 3, appears comfortable  Psychiatric: Normal  HEENT: Normocephalic, Atraumatic Trachea Midline, No torticollis  Pulmonary: No audible wheezing or respiratory distress   Abdomen/GI: Non tender, non distended   Cardiovascular: No pitting edema, 2+ radial pulse   Skin: No masses, erythema, lacerations, fluctation, ulcerations  Neurovascular: Sensation Intact to the Median, Ulnar, Radial Nerve, Motor Intact to the Median, Ulnar, Radial Nerve and Pulses Intact  Musculoskeletal: Normal, except as noted in detailed exam and in HPI.     MUSCULOSKELETAL EXAMINATION:  Right wrist:  60 flex and extension  TTP at radialcarpal joint and listers tubercle  snuff box no tenderness  tender at SL ligament, slow recovery, (-) ruiz test,   ___________________________________________________  STUDIES REVIEWED:  No studies to review     PROCEDURES PERFORMED:  Procedures  No Procedures performed today  _____________________________________________________    Juana Bosch    I,:  Catherine Avalos am acting as a scribe while in the presence of the attending physician.:       I,:  Theodora Blackmon MD personally performed the services described in this documentation    as scribed in my presence.:

## 2023-08-29 NOTE — PROGRESS NOTES
Daily Note     Today's date: 2023  Patient name: Indira Liao  : 2005  MRN: [id number]  Referring provider: Kieran Miller MD  Dx:   Encounter Diagnosis     ICD-10-CM    1. Pain in right wrist  M25.531                      Subjective: It feels a lot better than it has. I can actually use my hand again. I do have pain when I extend my wrist though    Objective: See treatment diary below    Assessment: Tolerated treatment fair. Reported less pain in the dorsal wrist on this date and swelling has slightly decreased. Continued to fatigue with velcro pulls. Plan: Continue per plan of care. Progress treatment as tolerated.        Precautions: Universal      Manuals        STM  4' 4'          MEM  4' 4'          IASTM uln wrist    8' 8' 8'       KT    uln f/a, wrist dorsal wrist Dorsal wrist       Neuro Re-Ed                                                                                                        Ther Ex             HEP Tendon glides    Opposition    Wrist F/E            Wrist stretching    5' 5'  5'       Wrist AROM  x20 F/E and R/U deviation w/ dowel x20 F/E and R/U deviation w/ dowel          Hook fist  x5 down and back x5 down and back          Metal Sphere  2' 2' 2' 2'        Wall walking    TB 5x each TB 5x each TB 5x each       Wrist Maze  x5 x5 5x 5x 5x       Gripping-digiflex     yellow isol 10x  each R iso x10    G comp x20       pinching     velcro board 1x Velcro board x1                    Pencil Grasps  x1 (Down to 6)           Ther Activity             Keypegs  x1 x1 1x 1x x1       Colored Pegs   x3 rows                                                 Modalities             MHP  5' 5' 5' 5' 5'

## 2023-09-09 ENCOUNTER — HOSPITAL ENCOUNTER (OUTPATIENT)
Dept: MRI IMAGING | Facility: HOSPITAL | Age: 18
Discharge: HOME | End: 2023-09-09
Attending: STUDENT IN AN ORGANIZED HEALTH CARE EDUCATION/TRAINING PROGRAM
Payer: COMMERCIAL

## 2023-09-09 DIAGNOSIS — S60.211D CONTUSION OF RIGHT WRIST, SUBSEQUENT ENCOUNTER: ICD-10-CM

## 2023-09-09 PROCEDURE — G1004 CDSM NDSC: HCPCS

## 2023-09-09 PROCEDURE — 73221 MRI JOINT UPR EXTREM W/O DYE: CPT

## 2023-09-19 ENCOUNTER — TELEPHONE (OUTPATIENT)
Dept: OBGYN CLINIC | Facility: CLINIC | Age: 18
End: 2023-09-19

## 2023-09-19 DIAGNOSIS — A74.9 CHLAMYDIA: Primary | ICD-10-CM

## 2023-09-19 DIAGNOSIS — Z86.19 HISTORY OF CHLAMYDIA INFECTION: ICD-10-CM

## 2023-09-19 NOTE — TELEPHONE ENCOUNTER
Spoke w/ patient regarding RIZWAN. Pt prefers to go to lab and give urine sample. Order placed in chart, pt to go when available, advised not to urinate for at least 1 hr prior to giving sample and we will call w/ results.

## 2023-09-19 NOTE — TELEPHONE ENCOUNTER
Attempt to reach patient to schedule or send for test of cure. Message left for patient to call office.

## 2023-10-02 ENCOUNTER — OFFICE VISIT (OUTPATIENT)
Dept: OBGYN CLINIC | Facility: CLINIC | Age: 18
End: 2023-10-02
Payer: COMMERCIAL

## 2023-10-02 VITALS
WEIGHT: 230.6 LBS | DIASTOLIC BLOOD PRESSURE: 72 MMHG | HEIGHT: 68 IN | BODY MASS INDEX: 34.95 KG/M2 | HEART RATE: 70 BPM | SYSTOLIC BLOOD PRESSURE: 113 MMHG

## 2023-10-02 DIAGNOSIS — S60.211D CONTUSION OF RIGHT WRIST, SUBSEQUENT ENCOUNTER: Primary | ICD-10-CM

## 2023-10-02 PROCEDURE — 20605 DRAIN/INJ JOINT/BURSA W/O US: CPT | Performed by: STUDENT IN AN ORGANIZED HEALTH CARE EDUCATION/TRAINING PROGRAM

## 2023-10-02 PROCEDURE — 99214 OFFICE O/P EST MOD 30 MIN: CPT | Performed by: STUDENT IN AN ORGANIZED HEALTH CARE EDUCATION/TRAINING PROGRAM

## 2023-10-02 RX ORDER — BETAMETHASONE SODIUM PHOSPHATE AND BETAMETHASONE ACETATE 3; 3 MG/ML; MG/ML
6 INJECTION, SUSPENSION INTRA-ARTICULAR; INTRALESIONAL; INTRAMUSCULAR; SOFT TISSUE
Status: COMPLETED | OUTPATIENT
Start: 2023-10-02 | End: 2023-10-02

## 2023-10-02 RX ORDER — BUPIVACAINE HYDROCHLORIDE 2.5 MG/ML
1 INJECTION, SOLUTION INFILTRATION; PERINEURAL
Status: COMPLETED | OUTPATIENT
Start: 2023-10-02 | End: 2023-10-02

## 2023-10-02 RX ADMIN — BUPIVACAINE HYDROCHLORIDE 1 ML: 2.5 INJECTION, SOLUTION INFILTRATION; PERINEURAL at 10:30

## 2023-10-02 RX ADMIN — BETAMETHASONE SODIUM PHOSPHATE AND BETAMETHASONE ACETATE 6 MG: 3; 3 INJECTION, SUSPENSION INTRA-ARTICULAR; INTRALESIONAL; INTRAMUSCULAR; SOFT TISSUE at 10:30
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
## 2023-12-10 ENCOUNTER — APPOINTMENT (EMERGENCY)
Dept: CT IMAGING | Facility: HOSPITAL | Age: 18
End: 2023-12-10

## 2023-12-10 ENCOUNTER — HOSPITAL ENCOUNTER (EMERGENCY)
Facility: HOSPITAL | Age: 18
Discharge: HOME | End: 2023-12-11
Attending: EMERGENCY MEDICINE | Admitting: EMERGENCY MEDICINE

## 2023-12-10 DIAGNOSIS — S39.012A STRAIN OF LUMBAR REGION, INITIAL ENCOUNTER: Primary | ICD-10-CM

## 2023-12-10 DIAGNOSIS — R10.9 FLANK PAIN: ICD-10-CM

## 2023-12-10 LAB
ALBUMIN SERPL BCP-MCNC: 4.5 G/DL (ref 3.5–5)
ALP SERPL-CCNC: 85 U/L (ref 34–104)
ALT SERPL W P-5'-P-CCNC: 27 U/L (ref 7–52)
ANION GAP SERPL CALCULATED.3IONS-SCNC: 9 MMOL/L
AST SERPL W P-5'-P-CCNC: 40 U/L (ref 13–39)
BASOPHILS # BLD AUTO: 0.05 THOUSANDS/ÂΜL (ref 0–0.1)
BASOPHILS NFR BLD AUTO: 0 % (ref 0–1)
BILIRUB DIRECT SERPL-MCNC: 0.08 MG/DL (ref 0–0.2)
BILIRUB SERPL-MCNC: 0.33 MG/DL (ref 0.2–1)
BUN SERPL-MCNC: 11 MG/DL (ref 5–25)
CALCIUM SERPL-MCNC: 10 MG/DL (ref 8.4–10.2)
CHLORIDE SERPL-SCNC: 103 MMOL/L (ref 96–108)
CO2 SERPL-SCNC: 24 MMOL/L (ref 21–32)
CREAT SERPL-MCNC: 0.78 MG/DL (ref 0.6–1.3)
EOSINOPHIL # BLD AUTO: 0.06 THOUSAND/ÂΜL (ref 0–0.61)
EOSINOPHIL NFR BLD AUTO: 0 % (ref 0–6)
ERYTHROCYTE [DISTWIDTH] IN BLOOD BY AUTOMATED COUNT: 12.7 % (ref 11.6–15.1)
FLUAV RNA RESP QL NAA+PROBE: NEGATIVE
FLUBV RNA RESP QL NAA+PROBE: NEGATIVE
GFR SERPL CREATININE-BSD FRML MDRD: 109 ML/MIN/1.73SQ M
GLUCOSE SERPL-MCNC: 110 MG/DL (ref 65–140)
HCG SERPL QL: NEGATIVE
HCT VFR BLD AUTO: 43.7 % (ref 34.8–46.1)
HGB BLD-MCNC: 13.8 G/DL (ref 11.5–15.4)
IMM GRANULOCYTES # BLD AUTO: 0.09 THOUSAND/UL (ref 0–0.2)
IMM GRANULOCYTES NFR BLD AUTO: 0 % (ref 0–2)
LIPASE SERPL-CCNC: 19 U/L (ref 11–82)
LYMPHOCYTES # BLD AUTO: 1.55 THOUSANDS/ÂΜL (ref 0.6–4.47)
LYMPHOCYTES NFR BLD AUTO: 7 % (ref 14–44)
MCH RBC QN AUTO: 25.6 PG (ref 26.8–34.3)
MCHC RBC AUTO-ENTMCNC: 31.6 G/DL (ref 31.4–37.4)
MCV RBC AUTO: 81 FL (ref 82–98)
MONOCYTES # BLD AUTO: 0.89 THOUSAND/ÂΜL (ref 0.17–1.22)
MONOCYTES NFR BLD AUTO: 4 % (ref 4–12)
NEUTROPHILS # BLD AUTO: 18.57 THOUSANDS/ÂΜL (ref 1.85–7.62)
NEUTS SEG NFR BLD AUTO: 89 % (ref 43–75)
NRBC BLD AUTO-RTO: 0 /100 WBCS
PLATELET # BLD AUTO: 335 THOUSANDS/UL (ref 149–390)
PMV BLD AUTO: 9.3 FL (ref 8.9–12.7)
POTASSIUM SERPL-SCNC: 3.6 MMOL/L (ref 3.5–5.3)
PROT SERPL-MCNC: 8.4 G/DL (ref 6.4–8.4)
RBC # BLD AUTO: 5.4 MILLION/UL (ref 3.81–5.12)
RSV RNA RESP QL NAA+PROBE: NEGATIVE
SARS-COV-2 RNA RESP QL NAA+PROBE: NEGATIVE
SODIUM SERPL-SCNC: 136 MMOL/L (ref 135–147)
WBC # BLD AUTO: 21.21 THOUSAND/UL (ref 4.31–10.16)

## 2023-12-10 PROCEDURE — 74176 CT ABD & PELVIS W/O CONTRAST: CPT

## 2023-12-10 PROCEDURE — 84703 CHORIONIC GONADOTROPIN ASSAY: CPT | Performed by: EMERGENCY MEDICINE

## 2023-12-10 PROCEDURE — 80076 HEPATIC FUNCTION PANEL: CPT | Performed by: EMERGENCY MEDICINE

## 2023-12-10 PROCEDURE — 96375 TX/PRO/DX INJ NEW DRUG ADDON: CPT

## 2023-12-10 PROCEDURE — 96361 HYDRATE IV INFUSION ADD-ON: CPT

## 2023-12-10 PROCEDURE — 85025 COMPLETE CBC W/AUTO DIFF WBC: CPT | Performed by: EMERGENCY MEDICINE

## 2023-12-10 PROCEDURE — 96374 THER/PROPH/DIAG INJ IV PUSH: CPT

## 2023-12-10 PROCEDURE — 99284 EMERGENCY DEPT VISIT MOD MDM: CPT | Performed by: EMERGENCY MEDICINE

## 2023-12-10 PROCEDURE — 0241U HB NFCT DS VIR RESP RNA 4 TRGT: CPT | Performed by: EMERGENCY MEDICINE

## 2023-12-10 PROCEDURE — 36415 COLL VENOUS BLD VENIPUNCTURE: CPT | Performed by: EMERGENCY MEDICINE

## 2023-12-10 PROCEDURE — 80048 BASIC METABOLIC PNL TOTAL CA: CPT | Performed by: EMERGENCY MEDICINE

## 2023-12-10 PROCEDURE — 83690 ASSAY OF LIPASE: CPT | Performed by: EMERGENCY MEDICINE

## 2023-12-10 PROCEDURE — 99284 EMERGENCY DEPT VISIT MOD MDM: CPT

## 2023-12-10 PROCEDURE — 81001 URINALYSIS AUTO W/SCOPE: CPT | Performed by: EMERGENCY MEDICINE

## 2023-12-10 RX ORDER — METHOCARBAMOL 500 MG/1
500 TABLET, FILM COATED ORAL ONCE
Status: COMPLETED | OUTPATIENT
Start: 2023-12-10 | End: 2023-12-10

## 2023-12-10 RX ORDER — KETOROLAC TROMETHAMINE 30 MG/ML
15 INJECTION, SOLUTION INTRAMUSCULAR; INTRAVENOUS ONCE
Status: COMPLETED | OUTPATIENT
Start: 2023-12-10 | End: 2023-12-10

## 2023-12-10 RX ORDER — ONDANSETRON 2 MG/ML
4 INJECTION INTRAMUSCULAR; INTRAVENOUS ONCE
Status: COMPLETED | OUTPATIENT
Start: 2023-12-10 | End: 2023-12-10

## 2023-12-10 RX ORDER — ACETAMINOPHEN 325 MG/1
975 TABLET ORAL ONCE
Status: COMPLETED | OUTPATIENT
Start: 2023-12-10 | End: 2023-12-10

## 2023-12-10 RX ADMIN — KETOROLAC TROMETHAMINE 15 MG: 30 INJECTION INTRAMUSCULAR; INTRAVENOUS at 22:34

## 2023-12-10 RX ADMIN — METHOCARBAMOL TABLETS 500 MG: 500 TABLET, COATED ORAL at 22:32

## 2023-12-10 RX ADMIN — SODIUM CHLORIDE 1000 ML: 0.9 INJECTION, SOLUTION INTRAVENOUS at 23:07

## 2023-12-10 RX ADMIN — ONDANSETRON 4 MG: 2 INJECTION INTRAMUSCULAR; INTRAVENOUS at 22:34

## 2023-12-10 RX ADMIN — ACETAMINOPHEN 975 MG: 325 TABLET, FILM COATED ORAL at 22:32
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
## 2023-12-11 ENCOUNTER — TELEPHONE (OUTPATIENT)
Dept: OBGYN CLINIC | Facility: CLINIC | Age: 18
End: 2023-12-11

## 2023-12-11 VITALS
RESPIRATION RATE: 14 BRPM | TEMPERATURE: 97.6 F | DIASTOLIC BLOOD PRESSURE: 65 MMHG | OXYGEN SATURATION: 98 % | SYSTOLIC BLOOD PRESSURE: 106 MMHG | HEART RATE: 87 BPM

## 2023-12-11 LAB
BACTERIA UR QL AUTO: ABNORMAL /HPF
BILIRUB UR QL STRIP: NEGATIVE
CLARITY UR: CLEAR
COLOR UR: YELLOW
GLUCOSE UR STRIP-MCNC: NEGATIVE MG/DL
HGB UR QL STRIP.AUTO: NEGATIVE
KETONES UR STRIP-MCNC: ABNORMAL MG/DL
LEUKOCYTE ESTERASE UR QL STRIP: NEGATIVE
MUCOUS THREADS UR QL AUTO: ABNORMAL
NITRITE UR QL STRIP: NEGATIVE
NON-SQ EPI CELLS URNS QL MICRO: ABNORMAL /HPF
PH UR STRIP.AUTO: 5.5 [PH]
PROT UR STRIP-MCNC: ABNORMAL MG/DL
RBC #/AREA URNS AUTO: ABNORMAL /HPF
SP GR UR STRIP.AUTO: >=1.03 (ref 1–1.03)
UROBILINOGEN UR QL STRIP.AUTO: 0.2 E.U./DL
WBC #/AREA URNS AUTO: ABNORMAL /HPF

## 2023-12-11 PROCEDURE — 96361 HYDRATE IV INFUSION ADD-ON: CPT

## 2023-12-11 RX ORDER — ACETAMINOPHEN 500 MG
1000 TABLET ORAL EVERY 6 HOURS PRN
Qty: 40 TABLET | Refills: 0 | Status: SHIPPED | OUTPATIENT
Start: 2023-12-11

## 2023-12-11 RX ORDER — LIDOCAINE 50 MG/G
1 PATCH TOPICAL DAILY
Qty: 30 PATCH | Refills: 0 | Status: SHIPPED | OUTPATIENT
Start: 2023-12-11

## 2023-12-11 RX ORDER — NAPROXEN 500 MG/1
500 TABLET ORAL 2 TIMES DAILY WITH MEALS
Qty: 30 TABLET | Refills: 0 | Status: SHIPPED | OUTPATIENT
Start: 2023-12-11

## 2023-12-11 RX ORDER — LIDOCAINE 50 MG/G
1 PATCH TOPICAL ONCE
Status: DISCONTINUED | OUTPATIENT
Start: 2023-12-11 | End: 2023-12-11 | Stop reason: HOSPADM

## 2023-12-11 RX ORDER — METHOCARBAMOL 500 MG/1
500 TABLET, FILM COATED ORAL 3 TIMES DAILY PRN
Qty: 20 TABLET | Refills: 0 | Status: SHIPPED | OUTPATIENT
Start: 2023-12-11

## 2023-12-11 RX ADMIN — LIDOCAINE 1 PATCH: 700 PATCH TOPICAL at 00:51

## 2023-12-11 NOTE — DISCHARGE INSTRUCTIONS
CT scan, blood work, urinalysis showed no significant abnormalities. Take tylenol every 6 hours, naproxen every 12 hours and use lidocaine patches 12 hours then 12 hours off. Follow-up with comprehensive spine. Come back for new or worsening symptoms.

## 2023-12-11 NOTE — TELEPHONE ENCOUNTER
Called and lvm for pt to r/s their appt with Dr. Saida Polanco on 12/18. Let pt know they could move to earlier that day or a different day entirely. Included a good phone # for pt to call to r/s.

## 2023-12-11 NOTE — ED PROVIDER NOTES
History  Chief Complaint   Patient presents with    Back Pain     Pt presents to the ed with right flank that has now traveled to the center of the lower back, reports two episodes of vomiting, no trauma of injury, or difficulty urinating, no meds pta      18 y/o female hx of PCOS and back pain presents for left-sided flank pain. She reports that earlier today she developed pain starting the left flank. Going centrally and then across to her back bilaterally. Worse on the left flank. During one of the episodes of pain she vomited. Vomit was nonbloody and nonbilious. She states that she has a reaction of vomiting to pain. Pain went away briefly and then came back. She denies urinary complaints. No hematuria. No numbness, weakness, urinary incontinence or urinary retention, cancer history, fevers. Flank Pain  Pain location:  R flank  Pain quality: cramping    Pain radiation: low back. Pain severity:  Severe  Onset quality:  Sudden  Duration:  1 day  Timing:  Intermittent  Progression:  Worsening  Chronicity:  New  Ineffective treatments:  None tried  Associated symptoms: nausea and vomiting        Prior to Admission Medications   Prescriptions Last Dose Informant Patient Reported? Taking? Diclofenac Sodium (VOLTAREN) 1 %   No No   Sig: Apply 2 g topically 4 (four) times a day   hydrocortisone 1 % cream   No No   Sig: Apply to affected area 2 times daily   norgestimate-ethinyl estradiol (Sprintec 28) 0.25-35 MG-MCG per tablet   No No   Sig: Take 1 tablet by mouth daily      Facility-Administered Medications: None       Past Medical History:   Diagnosis Date    Back pain     PCOS (polycystic ovarian syndrome)        History reviewed. No pertinent surgical history.     Family History   Problem Relation Age of Onset    No Known Problems Mother     Prostate cancer Father     No Known Problems Brother     No Known Problems Brother     Breast cancer Maternal Aunt     Colon cancer Neg Hx     Ovarian cancer Neg Hx     Deep vein thrombosis Neg Hx     Pulmonary embolism Neg Hx     Heart attack Neg Hx     Stroke Neg Hx      I have reviewed and agree with the history as documented. E-Cigarette/Vaping    E-Cigarette Use Never User      E-Cigarette/Vaping Substances     Social History     Tobacco Use    Smoking status: Never    Smokeless tobacco: Never   Vaping Use    Vaping Use: Never used   Substance Use Topics    Alcohol use: Never    Drug use: Never       Review of Systems   Gastrointestinal:  Positive for nausea and vomiting. Genitourinary:  Positive for flank pain. All other systems reviewed and are negative. Physical Exam  Physical Exam  Vitals and nursing note reviewed. Constitutional:       General: She is not in acute distress. Appearance: Normal appearance. She is not ill-appearing. HENT:      Head: Normocephalic and atraumatic. Right Ear: External ear normal.      Left Ear: External ear normal.      Nose: Nose normal.      Mouth/Throat:      Mouth: Mucous membranes are moist.   Eyes:      General:         Right eye: No discharge. Left eye: No discharge. Conjunctiva/sclera: Conjunctivae normal.   Cardiovascular:      Rate and Rhythm: Normal rate and regular rhythm. Pulses: Normal pulses. Heart sounds: No murmur heard. Pulmonary:      Effort: Pulmonary effort is normal.      Breath sounds: Normal breath sounds. Abdominal:      General: Abdomen is flat. There is no distension. Tenderness: There is no abdominal tenderness. There is right CVA tenderness. Musculoskeletal:         General: Tenderness present. Normal range of motion. Cervical back: Normal range of motion. Comments: Tenderness to palpation in the paraspinal musculature on the right around the flank. No central spinal tenderness. Skin:     General: Skin is warm. Capillary Refill: Capillary refill takes less than 2 seconds. Findings: No rash.    Neurological: General: No focal deficit present. Mental Status: She is alert. Mental status is at baseline. Comments: Normal sensation to light touch in lower extremities bilaterally including within the medial thighs. Normal strength in lower extremities bilaterally.    Psychiatric:         Mood and Affect: Mood normal.         Behavior: Behavior normal.         Vital Signs  ED Triage Vitals   Temperature Pulse Respirations Blood Pressure SpO2   12/10/23 2203 12/10/23 2203 12/10/23 2203 12/10/23 2203 12/10/23 2203   97.6 °F (36.4 °C) 80 18 112/62 100 %      Temp Source Heart Rate Source Patient Position - Orthostatic VS BP Location FiO2 (%)   12/10/23 2203 12/10/23 2203 12/10/23 2203 12/10/23 2203 --   Oral Monitor Sitting Left arm       Pain Score       12/10/23 2311       8           Vitals:    12/10/23 2203 12/10/23 2311 12/11/23 0112   BP: 112/62 104/55 106/65   Pulse: 80 83 87   Patient Position - Orthostatic VS: Sitting Sitting Sitting         Visual Acuity      ED Medications  Medications   lidocaine (LIDODERM) 5 % patch 1 patch (1 patch Topical Medication Applied 12/11/23 0051)   ondansetron (ZOFRAN) injection 4 mg (4 mg Intravenous Given 12/10/23 2234)   ketorolac (TORADOL) injection 15 mg (15 mg Intravenous Given 12/10/23 2234)   acetaminophen (TYLENOL) tablet 975 mg (975 mg Oral Given 12/10/23 2232)   methocarbamol (ROBAXIN) tablet 500 mg (500 mg Oral Given 12/10/23 2232)   sodium chloride 0.9 % bolus 1,000 mL (0 mL Intravenous Stopped 12/11/23 0120)       Diagnostic Studies  Results Reviewed       Procedure Component Value Units Date/Time    Urine Microscopic [[id number]]  (Abnormal) Collected: 12/10/23 2346    Lab Status: Final result Specimen: Urine, Clean Catch Updated: 12/11/23 0011     RBC, UA 0-5 /hpf      WBC, UA 0-5 /hpf      Epithelial Cells Innumerable /hpf      Bacteria, UA Occasional /hpf      MUCUS THREADS Moderate    UA w Reflex to Microscopic w Reflex to Culture [[id number]]  (Abnormal) Collected: 12/10/23 2346    Lab Status: Final result Specimen: Urine, Clean Catch Updated: 12/11/23 0002     Color, UA Yellow     Clarity, UA Clear     Specific Gravity, UA >=1.030     pH, UA 5.5     Leukocytes, UA Negative     Nitrite, UA Negative     Protein, UA Trace mg/dl      Glucose, UA Negative mg/dl      Ketones, UA Trace mg/dl      Urobilinogen, UA 0.2 E.U./dl      Bilirubin, UA Negative     Occult Blood, UA Negative    FLU/RSV/COVID - if FLU/RSV clinically relevant [[id number]]  (Normal) Collected: 12/10/23 2309    Lab Status: Final result Specimen: Nares from Nose Updated: 12/10/23 2359     SARS-CoV-2 Negative     INFLUENZA A PCR Negative     INFLUENZA B PCR Negative     RSV PCR Negative    Narrative:      FOR PEDIATRIC PATIENTS - copy/paste COVID Guidelines URL to browser: [email or web address]    SARS-CoV-2 assay is a Nucleic Acid Amplification assay intended for the  qualitative detection of nucleic acid from SARS-CoV-2 in nasopharyngeal  swabs. Results are for the presumptive identification of SARS-CoV-2 RNA. Positive results are indicative of infection with SARS-CoV-2, the virus  causing COVID-19, but do not rule out bacterial infection or co-infection  with other viruses. Laboratories within the [facility] and its  territories are required to report all positive results to the appropriate  public health authorities. Negative results do not preclude SARS-CoV-2  infection and should not be used as the sole basis for treatment or other  patient management decisions. Negative results must be combined with  clinical observations, patient history, and epidemiological information. This test has not been FDA cleared or approved. This test has been authorized by FDA under an Emergency Use Authorization  (EUA).  This test is only authorized for the duration of time the  declaration that circumstances exist justifying the authorization of the  emergency use of an in vitro diagnostic tests for detection of SARS-CoV-2  virus and/or diagnosis of COVID-19 infection under section 564(b)(1) of  the Act, 21 U. S.C. 178ENP-0(C)(7), unless the authorization is terminated  or revoked sooner. The test has been validated but independent review by FDA  and CLIA is pending. Test performed using [employer] GeneXpert: This RT-PCR assay targets N2,  a region unique to SARS-CoV-2. A conserved region in the E-gene was chosen  for pan-Sarbecovirus detection which includes SARS-CoV-2. According to CMS-2020-01-R, this platform meets the definition of high-throughput technology.     hCG, qualitative pregnancy [[id number]]  (Normal) Collected: 12/10/23 2235    Lab Status: Final result Specimen: Blood from Arm, Left Updated: 12/10/23 2317     Preg, Serum Negative    Basic metabolic panel [[id number]] Collected: 12/10/23 2235    Lab Status: Final result Specimen: Blood from Arm, Left Updated: 12/10/23 2312     Sodium 136 mmol/L      Potassium 3.6 mmol/L      Chloride 103 mmol/L      CO2 24 mmol/L      ANION GAP 9 mmol/L      BUN 11 mg/dL      Creatinine 0.78 mg/dL      Glucose 110 mg/dL      Calcium 10.0 mg/dL      eGFR 109 ml/min/1.73sq m     Narrative:      [place] guidelines for Chronic Kidney Disease (CKD):     Stage 1 with normal or high GFR (GFR > 90 mL/min/1.73 square meters)    Stage 2 Mild CKD (GFR = 60-89 mL/min/1.73 square meters)    Stage 3A Moderate CKD (GFR = 45-59 mL/min/1.73 square meters)    Stage 3B Moderate CKD (GFR = 30-44 mL/min/1.73 square meters)    Stage 4 Severe CKD (GFR = 15-29 mL/min/1.73 square meters)    Stage 5 End Stage CKD (GFR <15 mL/min/1.73 square meters)  Note: GFR calculation is accurate only with a steady state creatinine    Hepatic function panel [[id number]]  (Abnormal) Collected: 12/10/23 2235    Lab Status: Final result Specimen: Blood from Arm, Left Updated: 12/10/23 2312     Total Bilirubin 0.33 mg/dL Bilirubin, Direct 0.08 mg/dL      Alkaline Phosphatase 85 U/L      AST 40 U/L      ALT 27 U/L      Total Protein 8.4 g/dL      Albumin 4.5 g/dL     Lipase [[id number]]  (Normal) Collected: 12/10/23 2235    Lab Status: Final result Specimen: Blood from Arm, Left Updated: 12/10/23 2312     Lipase 19 u/L     CBC and differential [[id number]]  (Abnormal) Collected: 12/10/23 2235    Lab Status: Final result Specimen: Blood from Arm, Left Updated: 12/10/23 2258     WBC 21.21 Thousand/uL      RBC 5.40 Million/uL      Hemoglobin 13.8 g/dL      Hematocrit 43.7 %      MCV 81 fL      MCH 25.6 pg      MCHC 31.6 g/dL      RDW 12.7 %      MPV 9.3 fL      Platelets 188 Thousands/uL      nRBC 0 /100 WBCs      Neutrophils Relative 89 %      Immat GRANS % 0 %      Lymphocytes Relative 7 %      Monocytes Relative 4 %      Eosinophils Relative 0 %      Basophils Relative 0 %      Neutrophils Absolute 18.57 Thousands/µL      Immature Grans Absolute 0.09 Thousand/uL      Lymphocytes Absolute 1.55 Thousands/µL      Monocytes Absolute 0.89 Thousand/µL      Eosinophils Absolute 0.06 Thousand/µL      Basophils Absolute 0.05 Thousands/µL                    CT abdomen pelvis wo contrast   Final Result by Becki Gomez MD (12/11 0111)      No acute abnormality in the abdomen or pelvis to account for left flank pain. Workstation performed: [id number]                    Procedures  Procedures         ED Course  ED Course as of 12/11/23 0255   Mon Dec 11, 2023   0011 Epithelial Cells(!): Innumerable  Dirty catch         CRAFFT      Flowsheet Row Most Recent Value   CRAFFT Initial Screen: During the past 12 months, did you:    1. Drink any alcohol (more than a few sips)? No Filed at: 12/10/2023 2203   2. Smoke any marijuana or hashish No Filed at: 12/10/2023 2203   3. Use anything else to get high? ("anything else" includes illegal drugs, over the counter and prescription drugs, and things that you sniff or 'ramos')?  No Filed at: 12/10/2023 2203                                            Medical Decision Making  Patient with right-sided flank pain. Consistent with a strain. She also notes that it was paroxysmal earlier today. Will evaluate for her pyelonephritis, nephrolithiasis, UTI, JOANN, electrolyte derangement. Workup with leukocytosis. Otherwise no significant findings. Nonspecific. .  Will discharge home. Follow-up with comprehensive spine. Problems Addressed:  Flank pain: acute illness or injury  Strain of lumbar region, initial encounter: acute illness or injury    Amount and/or Complexity of Data Reviewed  Labs: ordered. Decision-making details documented in ED Course. Radiology: ordered. Risk  OTC drugs. Prescription drug management. Disposition  Final diagnoses:   Strain of lumbar region, initial encounter   Flank pain     Time reflects when diagnosis was documented in both MDM as applicable and the Disposition within this note       Time User Action Codes Description Comment    12/11/2023 12:17 AM Rina Massey Add [S39.012A] Strain of lumbar region, initial encounter     12/11/2023  1:18 AM Swapnil Archuleta Add [R10.9] Flank pain           ED Disposition       ED Disposition   Discharge    Condition   Stable    Date/Time   Mon Dec 11, 2023 [address] discharge to home/self care.                    Follow-up Information       Follow up With Specialties Details Why Contact Info Additional [address] [facility],[address] Emergency Department Emergency Medicine  If symptoms worsen [address] [place] [address] 37 [phone]  [address] [facility] Emergency Department, [address] [place] [address] Dr, [address] [facility]  For re-evaluation as soon as possible [facility] [address] [place] [address],[address] Floor Program Physical Therapy Schedule an appointment as soon as possible for a visit  For re-evaluation as soon as possible [phone]            Discharge Medication List as of 12/11/2023  1:22 AM        START taking these medications    Details   acetaminophen (TYLENOL) 500 mg tablet Take 2 tablets (1,000 mg total) by mouth every 6 (six) hours as needed for moderate pain, Starting Mon 12/11/2023, Normal      lidocaine (Lidoderm) 5 % Apply 1 patch topically over 12 hours daily Remove & Discard patch within 12 hours or as directed by MD, Starting Mon 12/11/2023, Normal      methocarbamol (ROBAXIN) 500 mg tablet Take 1 tablet (500 mg total) by mouth 3 (three) times a day as needed for muscle spasms, Starting Mon 12/11/2023, Normal      naproxen (Naprosyn) 500 mg tablet Take 1 tablet (500 mg total) by mouth 2 (two) times a day with meals, Starting Mon 12/11/2023, Normal           CONTINUE these medications which have NOT CHANGED    Details   Diclofenac Sodium (VOLTAREN) 1 % Apply 2 g topically 4 (four) times a day, Starting Tue 8/29/2023, Normal      hydrocortisone 1 % cream Apply to affected area 2 times daily, Normal      norgestimate-ethinyl estradiol (Sprintec 28) 0.25-35 MG-MCG per tablet Take 1 tablet by mouth daily, Starting Thu 2/23/2023, Until Tue 5/23/2023, Normal                 PDMP Review       None            ED Provider  Electronically Signed by             Maggie Rosario DO  12/11/23 7179

## 2023-12-12 ENCOUNTER — TELEPHONE (OUTPATIENT)
Dept: PHYSICAL THERAPY | Facility: OTHER | Age: 18
End: 2023-12-12

## 2023-12-15 NOTE — TELEPHONE ENCOUNTER
Call placed to the patient per [facility] referral.     V/m left for patient to call back. Phone number and hours of business provided. This is the 2nd attempt to reach the patient. Will defer per protocol.

## 2023-12-18 ENCOUNTER — TELEPHONE (OUTPATIENT)
Dept: OBGYN CLINIC | Facility: CLINIC | Age: 18
End: 2023-12-18

## 2023-12-20 NOTE — TELEPHONE ENCOUNTER
Call placed to the patient per [facility] referral.     Voice message left for patient to call back. Phone number and hours of business provided.     This is the 3rd attempt to reach the patient.   Referral closed.

## 2024-01-15 ENCOUNTER — OFFICE VISIT (OUTPATIENT)
Dept: OBGYN CLINIC | Facility: CLINIC | Age: 19
End: 2024-01-15
Payer: COMMERCIAL

## 2024-01-15 VITALS — WEIGHT: 240 LBS | HEIGHT: 68 IN | BODY MASS INDEX: 36.37 KG/M2

## 2024-01-15 DIAGNOSIS — S60.211D CONTUSION OF RIGHT WRIST, SUBSEQUENT ENCOUNTER: Primary | ICD-10-CM

## 2024-01-15 PROCEDURE — 99213 OFFICE O/P EST LOW 20 MIN: CPT | Performed by: STUDENT IN AN ORGANIZED HEALTH CARE EDUCATION/TRAINING PROGRAM
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
## 2024-01-16 RX ORDER — MELOXICAM 7.5 MG/1
7.5 TABLET ORAL DAILY
Qty: 14 TABLET | Refills: 0 | Status: SHIPPED | OUTPATIENT
Start: 2024-01-16

## 2024-02-07 ENCOUNTER — TELEPHONE (OUTPATIENT)
Dept: OBGYN CLINIC | Facility: CLINIC | Age: 19
End: 2024-02-07

## 2024-06-04 ENCOUNTER — TELEPHONE (OUTPATIENT)
Dept: OBGYN CLINIC | Facility: CLINIC | Age: 19
End: 2024-06-04

## 2024-06-07 ENCOUNTER — OFFICE VISIT (OUTPATIENT)
Dept: OBGYN CLINIC | Facility: CLINIC | Age: 19
End: 2024-06-07

## 2024-06-07 VITALS
HEART RATE: 67 BPM | SYSTOLIC BLOOD PRESSURE: 115 MMHG | BODY MASS INDEX: 37.28 KG/M2 | RESPIRATION RATE: 18 BRPM | WEIGHT: 246 LBS | HEIGHT: 68 IN | DIASTOLIC BLOOD PRESSURE: 77 MMHG

## 2024-06-07 DIAGNOSIS — Z30.09 BIRTH CONTROL COUNSELING: Primary | ICD-10-CM

## 2024-06-07 PROCEDURE — 99213 OFFICE O/P EST LOW 20 MIN: CPT | Performed by: OBSTETRICS & GYNECOLOGY

## 2024-06-07 NOTE — PROGRESS NOTES
Ambulatory Visit  Name: Elder Odom      : 2005      MRN: [id number]  Encounter Provider: Resident THERESA Casarez  Encounter Date: 2024   Encounter department: [facility]    Assessment & Plan   1. Birth control counseling  Comments:  Advised against Paragard as first line due to potential side effects. Discussed options and risks/benefits. Will order Mirena for birth control/PCOS symptoms.      History of Present Illness     Elder Odom is a 18 y.o. female with a history of PCOS who presents to discuss birth control. She was previously on Sprintec, which she last took for a month roughly one year ago, and discontinued due to the relationship she was in ending. Today she is inquiring about a copper IUD because she has spoken with someone who has one who recommended it to her.      Review of Systems   Gastrointestinal:  Negative for abdominal pain, nausea and vomiting.   Endocrine:        Some facial hair growth and acne   Genitourinary:         Irregular menstrual periods     Current Outpatient Medications on File Prior to Visit   Medication Sig Dispense Refill    Diclofenac Sodium (VOLTAREN) 1 % Apply 2 g topically 4 (four) times a day 100 g 1    hydrocortisone 1 % cream Apply to affected area 2 times daily 45 g 0    lidocaine (Lidoderm) 5 % Apply 1 patch topically over 12 hours daily Remove & Discard patch within 12 hours or as directed by MD 30 patch 0    meloxicam (Mobic) 7.5 mg tablet Take 1 tablet (7.5 mg total) by mouth daily 14 tablet 0    methocarbamol (ROBAXIN) 500 mg tablet Take 1 tablet (500 mg total) by mouth 3 (three) times a day as needed for muscle spasms 20 tablet 0    acetaminophen (TYLENOL) 500 mg tablet Take 2 tablets (1,000 mg total) by mouth every 6 (six) hours as needed for moderate pain (Patient not taking: Reported on 2024) 40 tablet 0    [DISCONTINUED] norgestimate-ethinyl estradiol (Sprintec 28) 0.25-35 MG-MCG per tablet Take 1 tablet  "by mouth daily 28 tablet 3     No current facility-administered medications on file prior to visit.      Objective     /77 (BP Location: Right arm, Patient Position: Sitting, Cuff Size: Large)   Pulse 67   Resp 18   Ht 5' 8\" (1.727 m)   Wt 112 kg (246 lb)   LMP 05/12/2024 (Exact Date)   BMI 37.40 kg/m²     Physical Exam  Constitutional:       General: She is not in acute distress.     Appearance: She is not toxic-appearing.   HENT:      Head: Normocephalic and atraumatic.   Eyes:      Conjunctiva/sclera: Conjunctivae normal.   Cardiovascular:      Rate and Rhythm: Normal rate.   Pulmonary:      Effort: Pulmonary effort is normal.   Skin:     General: Skin is warm and dry.      Comments: Few scattered comedones on face   Neurological:      Mental Status: She is alert and oriented to person, place, and time.      Gait: Gait normal.   Psychiatric:         Mood and Affect: Mood normal.         Behavior: Behavior normal.         Thought Content: Thought content normal.         Judgment: Judgment normal.       Administrative Statements           "

## 2024-06-19 ENCOUNTER — PROCEDURE VISIT (OUTPATIENT)
Dept: OBGYN CLINIC | Facility: CLINIC | Age: 19
End: 2024-06-19

## 2024-06-19 VITALS
SYSTOLIC BLOOD PRESSURE: 110 MMHG | RESPIRATION RATE: 18 BRPM | HEART RATE: 80 BPM | WEIGHT: 240 LBS | DIASTOLIC BLOOD PRESSURE: 71 MMHG | HEIGHT: 68 IN | BODY MASS INDEX: 36.37 KG/M2

## 2024-06-19 DIAGNOSIS — Z20.2 POSSIBLE EXPOSURE TO STD: ICD-10-CM

## 2024-06-19 DIAGNOSIS — Z32.02 PREGNANCY TEST NEGATIVE: Primary | ICD-10-CM

## 2024-06-19 DIAGNOSIS — A74.9 CHLAMYDIA: ICD-10-CM

## 2024-06-19 LAB — SL AMB POCT URINE HCG: NORMAL

## 2024-06-19 PROCEDURE — 81025 URINE PREGNANCY TEST: CPT | Performed by: NURSE PRACTITIONER

## 2024-06-19 PROCEDURE — 87491 CHLMYD TRACH DNA AMP PROBE: CPT | Performed by: NURSE PRACTITIONER

## 2024-06-19 PROCEDURE — 87591 N.GONORRHOEAE DNA AMP PROB: CPT | Performed by: NURSE PRACTITIONER

## 2024-06-19 PROCEDURE — 99213 OFFICE O/P EST LOW 20 MIN: CPT | Performed by: NURSE PRACTITIONER

## 2024-06-19 NOTE — PROGRESS NOTES
"Ambulatory Visit  Name: Elder Odom      : 2005      MRN: [id number]  Encounter Provider: CONNER Morse  Encounter Date: 2024   Encounter department: [facility]    Assessment & Plan   1. Pregnancy test negative  -     POCT urine HCG  2. Chlamydia  -     Chlamydia/GC amplified DNA by PCR  Will call results to pt.  If negative test patient may return next week for Mirena IUD insertion    History of Present Illness     Elder Odom is a 18 y.o. female who presents for Mirena iUD insertion, but chart review shows she had a positive chlamydia test 23 and did not have a followup test. recommend follow-up test today.  Patient reports she was treated and currently not with that partner.  She denies any current symptoms. after her positive test messages were left for patient to return to office for testing or complete urine test in lab.   Reviewed with patient to check for other STDs by blood work and patient consents.  Reviewed questions that patient had on Mirena and information provided on Mirena for her to review and call with any further questions.  Reviewed device, insertion procedure and risks, benefits.  Sometimes has irregular menses due to her history of PCOS.  Her LMP was 2024.  Has  consistent use with condoms per patient.     Review of Systems   Constitutional:  Negative for chills and fever.   Respiratory: Negative.     Cardiovascular: Negative.    Genitourinary:  Positive for menstrual problem.     Pertinent Medical History         Objective     /71 (BP Location: Right arm, Patient Position: Sitting, Cuff Size: Adult)   Pulse 80   Resp 18   Ht 5' 8\" (1.727 m)   Wt 109 kg (240 lb)   LMP 2024 (Exact Date)   BMI 36.49 kg/m²     Physical Exam  Cardiovascular:      Rate and Rhythm: Normal rate.   Pulmonary:      Effort: Pulmonary effort is normal.   Abdominal:      Palpations: Abdomen is soft.      Tenderness: There is no " abdominal tenderness.     External genitalia-no lesions or erythema  Vagina-normal white discharge  Cervix-negative CMT no lesions  Uterus-anteverted, nontender  Adnexa-no masses nontender      Administrative Statements

## 2024-06-21 LAB
C TRACH DNA SPEC QL NAA+PROBE: NEGATIVE
N GONORRHOEA DNA SPEC QL NAA+PROBE: NEGATIVE

## 2024-06-24 ENCOUNTER — HOSPITAL ENCOUNTER (OUTPATIENT)
Dept: RADIOLOGY | Facility: HOSPITAL | Age: 19
Discharge: HOME | End: 2024-06-24
Attending: STUDENT IN AN ORGANIZED HEALTH CARE EDUCATION/TRAINING PROGRAM

## 2024-06-24 ENCOUNTER — OFFICE VISIT (OUTPATIENT)
Dept: OBGYN CLINIC | Facility: CLINIC | Age: 19
End: 2024-06-24

## 2024-06-24 VITALS — WEIGHT: 240 LBS | BODY MASS INDEX: 36.37 KG/M2 | HEIGHT: 68 IN

## 2024-06-24 DIAGNOSIS — S60.211D CONTUSION OF RIGHT WRIST, SUBSEQUENT ENCOUNTER: ICD-10-CM

## 2024-06-24 DIAGNOSIS — M25.531 PAIN IN RIGHT WRIST: Primary | ICD-10-CM

## 2024-06-24 PROCEDURE — 20605 DRAIN/INJ JOINT/BURSA W/O US: CPT | Performed by: STUDENT IN AN ORGANIZED HEALTH CARE EDUCATION/TRAINING PROGRAM

## 2024-06-24 PROCEDURE — 73110 X-RAY EXAM OF WRIST: CPT

## 2024-06-24 PROCEDURE — 99214 OFFICE O/P EST MOD 30 MIN: CPT | Performed by: STUDENT IN AN ORGANIZED HEALTH CARE EDUCATION/TRAINING PROGRAM

## 2024-06-24 RX ADMIN — ROPIVACAINE HYDROCHLORIDE 1 ML: 5 INJECTION, SOLUTION EPIDURAL; INFILTRATION; PERINEURAL at 17:15

## 2024-06-24 RX ADMIN — BETAMETHASONE SODIUM PHOSPHATE AND BETAMETHASONE ACETATE 6 MG: 3; 3 INJECTION, SUSPENSION INTRA-ARTICULAR; INTRALESIONAL; INTRAMUSCULAR; SOFT TISSUE at 17:15
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
## 2024-06-25 RX ORDER — ROPIVACAINE HYDROCHLORIDE 5 MG/ML
1 INJECTION, SOLUTION EPIDURAL; INFILTRATION; PERINEURAL
Status: COMPLETED | OUTPATIENT
Start: 2024-06-24 | End: 2024-06-24

## 2024-06-25 RX ORDER — BETAMETHASONE SODIUM PHOSPHATE AND BETAMETHASONE ACETATE 3; 3 MG/ML; MG/ML
6 INJECTION, SUSPENSION INTRA-ARTICULAR; INTRALESIONAL; INTRAMUSCULAR; SOFT TISSUE
Status: COMPLETED | OUTPATIENT
Start: 2024-06-24 | End: 2024-06-24

## 2024-06-27 ENCOUNTER — PROCEDURE VISIT (OUTPATIENT)
Dept: OBGYN CLINIC | Facility: CLINIC | Age: 19
End: 2024-06-27

## 2024-06-27 VITALS
RESPIRATION RATE: 18 BRPM | DIASTOLIC BLOOD PRESSURE: 72 MMHG | HEART RATE: 78 BPM | HEIGHT: 68 IN | SYSTOLIC BLOOD PRESSURE: 107 MMHG | WEIGHT: 246 LBS | BODY MASS INDEX: 37.28 KG/M2

## 2024-06-27 DIAGNOSIS — Z32.02 PREGNANCY TEST NEGATIVE: Primary | ICD-10-CM

## 2024-06-27 DIAGNOSIS — Z30.430 ENCOUNTER FOR INSERTION OF MIRENA IUD: ICD-10-CM

## 2024-06-27 LAB — SL AMB POCT URINE HCG: NORMAL

## 2024-06-27 NOTE — PROGRESS NOTES
Iud insertions    Performed by: CONNER Simms  Authorized by: CONNER Simms    Procedure: IUD insertion    Procedure comment:  Assisted by Dr RENEE Waller  Consent obtained by patient, parent, or legal power of  - including discussion of procedure risks and benefits, patient questions answered, and patient education provided.: yes (Reviewed risk of pain, risk of infection, bleeding, uterine perforation with need for laparoscopy to retrieve, expulsion, and failure with increased risk of ectopic pregnancy.  Patient accepts these risks)    Pregnancy risk: reasonably certain the patient is not pregnant    Pregnancy risk comment:  UPT is negative  Date/Time of Insertion:  6/27/2024 2:45 PM  Pelvic exam performed: yes    Speculum placed in vagina: yes    Cervix cleaned and prepped: yes    Tenaculum/Allis/Ring Forceps applied to cervix: yes (Applied anteriorly)    Uterus sound depth (cm):  7  Cervix dilated: yes    Cervix dilated with:  Cervical os finder  IUD inserted without complications: yes    IUD type:  1 each Levonorgestrel 20 MCG/DAY  Strings trimmed to (cm):  4 (4cm)  Patient tolerated procedure well: yes    Inserted with ultrasound guidance: no    Transvaginal sono confirmed fundal placement: no    Estimated blood loss (mL):  0  Intended removal date: 8 years     Pt tolerated well,  Reviewed to call with pain or bleeding not tolerated with ibuprofen.  RTO in 4 wks for 1st pap and IUD check

## 2024-09-19 ENCOUNTER — OFFICE VISIT (OUTPATIENT)
Dept: OBGYN CLINIC | Facility: CLINIC | Age: 19
End: 2024-09-19

## 2024-09-19 DIAGNOSIS — Z20.2 POSSIBLE EXPOSURE TO STD: ICD-10-CM

## 2024-09-19 DIAGNOSIS — Z97.5 IUD (INTRAUTERINE DEVICE) IN PLACE: ICD-10-CM

## 2024-09-19 DIAGNOSIS — R39.9 UTI SYMPTOMS: Primary | ICD-10-CM

## 2024-09-19 LAB
SL AMB  POCT GLUCOSE, UA: NORMAL
SL AMB LEUKOCYTE ESTERASE,UA: NORMAL
SL AMB POCT BILIRUBIN,UA: NORMAL
SL AMB POCT BLOOD,UA: NORMAL
SL AMB POCT CLARITY,UA: NORMAL
SL AMB POCT COLOR,UA: NORMAL
SL AMB POCT KETONES,UA: NORMAL
SL AMB POCT NITRITE,UA: NORMAL
SL AMB POCT PH,UA: 5.5
SL AMB POCT SPECIFIC GRAVITY,UA: 1.03
SL AMB POCT URINE PROTEIN: NORMAL
SL AMB POCT UROBILINOGEN: NORMAL

## 2024-09-19 PROCEDURE — 87491 CHLMYD TRACH DNA AMP PROBE: CPT | Performed by: NURSE PRACTITIONER

## 2024-09-19 PROCEDURE — 99213 OFFICE O/P EST LOW 20 MIN: CPT | Performed by: NURSE PRACTITIONER

## 2024-09-19 PROCEDURE — 87591 N.GONORRHOEAE DNA AMP PROB: CPT | Performed by: NURSE PRACTITIONER

## 2024-09-19 PROCEDURE — 81003 URINALYSIS AUTO W/O SCOPE: CPT | Performed by: NURSE PRACTITIONER

## 2024-09-19 RX ORDER — NITROFURANTOIN 25; 75 MG/1; MG/1
100 CAPSULE ORAL 2 TIMES DAILY
Qty: 10 CAPSULE | Refills: 0 | Status: SHIPPED | OUTPATIENT
Start: 2024-09-19 | End: 2024-09-24

## 2024-09-19 NOTE — PROGRESS NOTES
"Ambulatory Visit  Name: Elder Odom      : 2005      MRN: [id number]  Encounter Provider: CONNER Morse  Encounter Date: 2024   Encounter department: [facility]    RTO for pap and 10/21/24  Assessment & Plan  UTI symptoms    Orders:    POCT urine dip auto non-scope    nitrofurantoin (MACROBID) 100 mg capsule; Take 1 capsule (100 mg total) by mouth 2 (two) times a day for 5 days  Reviewed UTI precautions with patient, information on AVS.  IUD (intrauterine device) in place  IUD placed 2024 effective until 32 Strings shortened to 3 cm         History of Present Illness     Elder Odom is a 19 y.o. female who presents with UTI symptoms that she has had for the past 2 days. Has burning urination, has urinary frequency and in small amounts.  Denies any fever, chills, nausea or vomiting.  Does have history of back pain not related to the symptoms.  She denies flank pain.  Uses new pot for washing.  She denies vaginal discharge odor or itching.  She has been using Azo that has helped with her the urgency.  She has had history of UTIs and feels this is definitely one.  Reviewed Azo may interfere with urine results.  She had a Mirena iUD placed 24. Gets monthly menses.  \" Loves the IUD\".  Needs an IUD check.  She consents to testing for chlamydia and gonorrhea today, is with the same sexual partner, declines STD blood work    Negative GC and CT 24.  Hx of chlamydia 23    History obtained from : patient  Review of Systems   Constitutional:  Negative for chills and fever.   Respiratory: Negative.     Cardiovascular: Negative.    Genitourinary:  Positive for dysuria, frequency and urgency.   Musculoskeletal:  Positive for back pain.     Pertinent Medical History         Objective     There were no vitals taken for this visit.    Physical Exam  Constitutional:       Appearance: Normal appearance.   Cardiovascular:      Rate and " Rhythm: Normal rate.   Pulmonary:      Effort: Pulmonary effort is normal.   Abdominal:      Palpations: Abdomen is soft.      Tenderness: There is no abdominal tenderness. There is no right CVA tenderness or left CVA tenderness.     External genitalia-no lesions or erythema  Vagina-normal white discharge, no blood  Cervix-negative CMT no lesions, IUD strings shortened to 3 cm  Uterus-anteverted, nontender  Adnexa-no masses nontender      Administrative Statements

## 2024-09-23 LAB
C TRACH DNA SPEC QL NAA+PROBE: NEGATIVE
N GONORRHOEA DNA SPEC QL NAA+PROBE: NEGATIVE

## 2024-09-24 ENCOUNTER — TELEPHONE (OUTPATIENT)
Dept: OBGYN CLINIC | Facility: CLINIC | Age: 19
End: 2024-09-24

## 2024-09-24 NOTE — TELEPHONE ENCOUNTER
----- Message from CONNER Simms sent at 9/23/2024  5:51 PM EDT -----  Please inform pt that her culture for chlamydia and gonorrhea were negative.  Thank You!

## 2025-05-29 ENCOUNTER — APPOINTMENT (EMERGENCY)
Dept: RADIOLOGY | Facility: HOSPITAL | Age: 20
End: 2025-05-29

## 2025-05-29 ENCOUNTER — HOSPITAL ENCOUNTER (EMERGENCY)
Facility: HOSPITAL | Age: 20
Discharge: HOME | End: 2025-05-29
Attending: EMERGENCY MEDICINE

## 2025-05-29 VITALS
RESPIRATION RATE: 22 BRPM | OXYGEN SATURATION: 97 % | DIASTOLIC BLOOD PRESSURE: 71 MMHG | SYSTOLIC BLOOD PRESSURE: 149 MMHG | HEART RATE: 77 BPM | TEMPERATURE: 99 F

## 2025-05-29 DIAGNOSIS — S62.634A CLOSED FRACTURE OF TUFT OF DISTAL PHALANX OF RIGHT RING FINGER: Primary | ICD-10-CM

## 2025-05-29 PROCEDURE — 99284 EMERGENCY DEPT VISIT MOD MDM: CPT | Performed by: EMERGENCY MEDICINE

## 2025-05-29 PROCEDURE — 73140 X-RAY EXAM OF FINGER(S): CPT

## 2025-05-29 PROCEDURE — 99283 EMERGENCY DEPT VISIT LOW MDM: CPT

## 2025-05-29 RX ORDER — ACETAMINOPHEN 325 MG/1
975 TABLET ORAL ONCE
Status: COMPLETED | OUTPATIENT
Start: 2025-05-29 | End: 2025-05-29

## 2025-05-29 RX ADMIN — ACETAMINOPHEN 975 MG: 325 TABLET, FILM COATED ORAL at 20:24

## 2025-05-29 NOTE — ED PROVIDER NOTES
Time reflects when diagnosis was documented in both MDM as applicable and the Disposition within this note       Time User Action Codes Description Comment    5/29/2025  9:02 PM Steffen Landa Add [G88.798U] Closed fracture of tuft of distal phalanx of finger     5/29/2025  9:02 PM Steffen Landa Remove [V69.816X] Closed fracture of tuft of distal phalanx of finger     5/29/2025  9:02 PM Steffen Landa Add [S50.971O] Closed fracture of tuft of distal phalanx of right ring finger           ED Disposition       ED Disposition   Discharge    Condition   Stable    Date/Time   Thu May 29, 2025  9:02 PM    Comment   Elder Odom discharge to home/self care.                   Assessment & Plan   {Hyperlinks  Risk Stratification - NIHSS - HEART SCORE - Fill out sepsis note and make sure you call [id number] if severe or septic shock:[id number]}    Medical Decision Making  Amount and/or Complexity of Data Reviewed  Radiology: ordered and independent interpretation performed. Decision-making details documented in ED Course.    Risk  OTC drugs.        ED Course as of 05/29/25 2103   Thu May 29, 2025   2042 XR finger fourth digit-ring RIGHT  Small, nondisplaced tuft fracture of the distal phalanx of the right ring finger.       Medications   acetaminophen (TYLENOL) tablet 975 mg (975 mg Oral Given 5/29/25 2024)       ED Risk Strat Scores                    No data recorded                            History of Present Illness   {Hyperlinks  History (Med, Surg, Fam, Social) - Current Medications - Allergies  :[id number]}    Chief Complaint   Patient presents with    Finger Injury     Pt reports dropping a 8 lbs weight on her right ring finger. No meds pta       Past Medical History[1]   Past Surgical History[2]   Family History[3]   Social History[4]   E-Cigarette/Vaping    E-Cigarette Use Current Some Day User       E-Cigarette/Vaping Substances    Nicotine No     Flavoring Yes       I have reviewed and agree with the history as  documented.     20 y/o female presents to the ED for evaluation of     There is tenderness and slightly painful range of motion of the distal phalanx of the right fourth/ring finger with no obvious bony deformity, no subungual hematoma, and no abrasions or lacerations.  Fingernail is intact.  Neurovascularly intact.        Review of Systems   Constitutional:  Negative for chills and fever.   HENT:  Negative for congestion, rhinorrhea and sore throat.    Respiratory:  Negative for cough and shortness of breath.    Cardiovascular:  Negative for chest pain and palpitations.   Gastrointestinal:  Negative for abdominal pain, diarrhea, nausea and vomiting.   Genitourinary:  Negative for dysuria and hematuria.   Musculoskeletal:  Negative for back pain and neck pain.        Pain at the tip of the right ring finger, see HPI   Neurological:  Negative for dizziness, weakness, light-headedness, numbness and headaches.   All other systems reviewed and are negative.          Objective   {Hyperlinks  Historical Vitals - Historical Labs - Chart Review/Microbiology - Last Echo - Code Status  :[id number]}    ED Triage Vitals   Temperature Pulse Blood Pressure Respirations SpO2 Patient Position - Orthostatic VS   05/29/25 1958 05/29/25 1958 05/29/25 1958 05/29/25 1958 05/29/25 1958 05/29/25 1958   99 °F (37.2 °C) 77 149/71 22 97 % Sitting      Temp Source Heart Rate Source BP Location FiO2 (%) Pain Score    05/29/25 1958 05/29/25 1958 05/29/25 1958 -- 05/29/25 2024    Oral Monitor Left arm  10 - Worst Possible Pain      Vitals      Date and Time Temp Pulse SpO2 Resp BP Pain Score FACES Pain Rating User   05/29/25 2024 -- -- -- -- -- 10 - Worst Possible Pain -- RN   05/29/25 1958 99 °F (37.2 °C) 77 97 % 22 149/71 -- -- BW            Physical Exam  Vitals and nursing note reviewed.   Constitutional:       General: She is not in acute distress.     Appearance: Normal appearance. She is normal weight. She is not ill-appearing.   HENT:       Head: Normocephalic and atraumatic.      Right Ear: External ear normal.      Left Ear: External ear normal.      Nose: Nose normal. No congestion or rhinorrhea.      Mouth/Throat:      Mouth: Mucous membranes are moist.      Pharynx: Oropharynx is clear. No oropharyngeal exudate or posterior oropharyngeal erythema.     Eyes:      Extraocular Movements: Extraocular movements intact.      Conjunctiva/sclera: Conjunctivae normal.      Pupils: Pupils are equal, round, and reactive to light.       Cardiovascular:      Rate and Rhythm: Normal rate and regular rhythm.      Pulses: Normal pulses.      Heart sounds: Normal heart sounds. No murmur heard.  Pulmonary:      Effort: Pulmonary effort is normal. No respiratory distress.      Breath sounds: Normal breath sounds. No wheezing or rales.   Abdominal:      General: Abdomen is flat. Bowel sounds are normal. There is no distension.      Palpations: Abdomen is soft.      Tenderness: There is no abdominal tenderness. There is no right CVA tenderness, left CVA tenderness or guarding.     Musculoskeletal:         General: Tenderness present. No swelling or deformity.      Cervical back: Normal range of motion and neck supple. No tenderness.      Comments: There is tenderness and slightly painful range of motion of the distal phalanx of the right fourth/ring finger with no obvious bony deformity, no subungual hematoma, and no abrasions or lacerations.  Fingernail is intact.  Neurovascularly intact.     Skin:     General: Skin is warm and dry.      Capillary Refill: Capillary refill takes less than 2 seconds.     Neurological:      General: No focal deficit present.      Mental Status: She is alert and oriented to person, place, and time.      Sensory: No sensory deficit.      Motor: No weakness.         Results Reviewed       None            XR finger fourth digit-ring RIGHT   ED Interpretation by Steffen Landa MD (05/29 2042)   Small, nondisplaced tuft fracture of the  distal phalanx of the right ring finger.          Procedures    ED Medication and Procedure Management   Prior to Admission Medications   Prescriptions Last Dose Informant Patient Reported? Taking?   Diclofenac Sodium (VOLTAREN) 1 %  Self No No   Sig: Apply 2 g topically 4 (four) times a day   PHENAZOPYRIDINE HCL PO  Self Yes No   Sig: Take by mouth   acetaminophen (TYLENOL) 500 mg tablet  Self No No   Sig: Take 2 tablets (1,000 mg total) by mouth every 6 (six) hours as needed for moderate pain   Patient not taking: Reported on 6/7/2024   hydrocortisone 1 % cream  Self No No   Sig: Apply to affected area 2 times daily   lidocaine (Lidoderm) 5 %  Self No No   Sig: Apply 1 patch topically over 12 hours daily Remove & Discard patch within 12 hours or as directed by MD   meloxicam (Mobic) 7.5 mg tablet  Self No No   Sig: Take 1 tablet (7.5 mg total) by mouth daily   methocarbamol (ROBAXIN) 500 mg tablet  Self No No   Sig: Take 1 tablet (500 mg total) by mouth 3 (three) times a day as needed for muscle spasms      Facility-Administered Medications Last Administration Doses Remaining   levonorgestrel (MIRENA) IUD 20 mcg/day None recorded         Patient's Medications   Discharge Prescriptions    No medications on file       ED SEPSIS DOCUMENTATION   Time reflects when diagnosis was documented in both MDM as applicable and the Disposition within this note       Time User Action Codes Description Comment    5/29/2025  9:02 PM Steffen Landa Add [B05.530Q] Closed fracture of tuft of distal phalanx of finger     5/29/2025  9:02 PM Steffen Landa Remove [U22.507I] Closed fracture of tuft of distal phalanx of finger     5/29/2025  9:02 PM Steffen Landa Add [S63.345E] Closed fracture of tuft of distal phalanx of right ring finger                      [1]   Past Medical History:  Diagnosis Date    Back pain     PCOS (polycystic ovarian syndrome)    [2] No past surgical history on file.  [3]   Family History  Problem Relation  Name Age of Onset    No Known Problems Mother      Prostate cancer Father      No Known Problems Brother      No Known Problems Brother      Breast cancer Maternal Aunt      Colon cancer Neg Hx      Ovarian cancer Neg Hx      Deep vein thrombosis Neg Hx      Pulmonary embolism Neg Hx      Heart attack Neg Hx      Stroke Neg Hx     [4]   Social History  Tobacco Use    Smoking status: Never    Smokeless tobacco: Never   Vaping Use    Vaping status: Some Days    Substances: Flavoring   Substance Use Topics    Alcohol use: Never    Drug use: Never      tablet (7.5 mg total) by mouth daily   methocarbamol (ROBAXIN) 500 mg tablet  Self No No   Sig: Take 1 tablet (500 mg total) by mouth 3 (three) times a day as needed for muscle spasms      Facility-Administered Medications Last Administration Doses Remaining   levonorgestrel (MIRENA) IUD 20 mcg/day None recorded         Discharge Medication List as of 5/29/2025  9:03 PM        CONTINUE these medications which have NOT CHANGED    Details   acetaminophen (TYLENOL) 500 mg tablet Take 2 tablets (1,000 mg total) by mouth every 6 (six) hours as needed for moderate pain, Starting Mon 12/11/2023, Normal      Diclofenac Sodium (VOLTAREN) 1 % Apply 2 g topically 4 (four) times a day, Starting Tue 8/29/2023, Normal      hydrocortisone 1 % cream Apply to affected area 2 times daily, Normal      lidocaine (Lidoderm) 5 % Apply 1 patch topically over 12 hours daily Remove & Discard patch within 12 hours or as directed by MD, Starting Mon 12/11/2023, Normal      meloxicam (Mobic) 7.5 mg tablet Take 1 tablet (7.5 mg total) by mouth daily, Starting Tue 1/16/2024, Normal      methocarbamol (ROBAXIN) 500 mg tablet Take 1 tablet (500 mg total) by mouth 3 (three) times a day as needed for muscle spasms, Starting Mon 12/11/2023, Normal      PHENAZOPYRIDINE HCL PO Take by mouth, Historical Med             ED SEPSIS DOCUMENTATION   Time reflects when diagnosis was documented in both MDM as applicable and the Disposition within this note       Time User Action Codes Description Comment    5/29/2025  9:02 PM Steffen Landa Add [A40.027K] Closed fracture of tuft of distal phalanx of finger     5/29/2025  9:02 PM Steffen Landa Remove [B89.779T] Closed fracture of tuft of distal phalanx of finger     5/29/2025  9:02 PM Steffen Landa Add [V41.109N] Closed fracture of tuft of distal phalanx of right ring finger                      [1]   Past Medical History:  Diagnosis Date    Back pain     PCOS (polycystic ovarian syndrome)    [2] No past  surgical history on file.  [3]   Family History  Problem Relation Name Age of Onset    No Known Problems Mother      Prostate cancer Father      No Known Problems Brother      No Known Problems Brother      Breast cancer Maternal Aunt      Colon cancer Neg Hx      Ovarian cancer Neg Hx      Deep vein thrombosis Neg Hx      Pulmonary embolism Neg Hx      Heart attack Neg Hx      Stroke Neg Hx     [4]   Social History  Tobacco Use    Smoking status: Never    Smokeless tobacco: Never   Vaping Use    Vaping status: Some Days    Substances: Flavoring   Substance Use Topics    Alcohol use: Never    Drug use: Never        Steffen Landa MD  06/12/25 0603

## 2025-05-30 ENCOUNTER — RESULTS FOLLOW-UP (OUTPATIENT)
Dept: EMERGENCY DEPT | Facility: HOSPITAL | Age: 20
End: 2025-05-30

## 2025-05-30 NOTE — DISCHARGE INSTRUCTIONS
Please keep finger splint in place until fracture heals in approximately 6 to 8 weeks.  You will also follow-up with your primary care provider and orthopedics.